# Patient Record
Sex: FEMALE | Race: WHITE | NOT HISPANIC OR LATINO | Employment: FULL TIME | URBAN - METROPOLITAN AREA
[De-identification: names, ages, dates, MRNs, and addresses within clinical notes are randomized per-mention and may not be internally consistent; named-entity substitution may affect disease eponyms.]

---

## 2017-07-21 ENCOUNTER — RADIANT APPOINTMENT (OUTPATIENT)
Dept: GENERAL RADIOLOGY | Facility: CLINIC | Age: 47
End: 2017-07-21
Attending: PHYSICIAN ASSISTANT
Payer: COMMERCIAL

## 2017-07-21 ENCOUNTER — TELEPHONE (OUTPATIENT)
Dept: FAMILY MEDICINE | Facility: OTHER | Age: 47
End: 2017-07-21

## 2017-07-21 ENCOUNTER — NURSE TRIAGE (OUTPATIENT)
Dept: NURSING | Facility: CLINIC | Age: 47
End: 2017-07-21

## 2017-07-21 ENCOUNTER — OFFICE VISIT (OUTPATIENT)
Dept: FAMILY MEDICINE | Facility: CLINIC | Age: 47
End: 2017-07-21
Payer: COMMERCIAL

## 2017-07-21 ENCOUNTER — TELEPHONE (OUTPATIENT)
Dept: FAMILY MEDICINE | Facility: CLINIC | Age: 47
End: 2017-07-21

## 2017-07-21 VITALS
BODY MASS INDEX: 25.49 KG/M2 | HEART RATE: 84 BPM | SYSTOLIC BLOOD PRESSURE: 120 MMHG | DIASTOLIC BLOOD PRESSURE: 70 MMHG | TEMPERATURE: 98.1 F | HEIGHT: 65 IN | WEIGHT: 153 LBS
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
## 2017-07-21 DIAGNOSIS — M54.50 ACUTE MIDLINE LOW BACK PAIN WITHOUT SCIATICA: ICD-10-CM

## 2017-07-21 DIAGNOSIS — M54.50 ACUTE MIDLINE LOW BACK PAIN WITHOUT SCIATICA: Primary | ICD-10-CM

## 2017-07-21 DIAGNOSIS — T14.8XXA ABRASION: ICD-10-CM

## 2017-07-21 PROCEDURE — 99214 OFFICE O/P EST MOD 30 MIN: CPT | Performed by: PHYSICIAN ASSISTANT

## 2017-07-21 PROCEDURE — 72100 X-RAY EXAM L-S SPINE 2/3 VWS: CPT

## 2017-07-21 RX ORDER — METHOCARBAMOL 750 MG/1
750 TABLET, FILM COATED ORAL 4 TIMES DAILY PRN
Qty: 30 TABLET | Refills: 0 | Status: SHIPPED | OUTPATIENT
Start: 2017-07-21 | End: 2018-03-29

## 2017-07-21 RX ORDER — HYDROCODONE BITARTRATE AND ACETAMINOPHEN 5; 325 MG/1; MG/1
1 TABLET ORAL EVERY 6 HOURS PRN
Qty: 16 TABLET | Refills: 0 | Status: SHIPPED | OUTPATIENT
Start: 2017-07-21 | End: 2018-03-29

## 2017-07-21 ASSESSMENT — PAIN SCALES - GENERAL: PAINLEVEL: EXTREME PAIN (8)

## 2017-07-21 NOTE — PROGRESS NOTES
"  SUBJECTIVE:                                                    Marychuy Gomes is a 46 year old female who presents to clinic today for the following health issues:    Back Pain       Duration:last night patient slipped on the stairs and landed on the edge of stairs. Was going down steps and slipped with feet out from under her. Landed on back and slid rest of way down. Carpeted steps. + rugburn and bled last night.    Pain takes her breath away but not pleuritic pain or SOB.    No LOC of head injury.    No nausea or vomiting.    No weakness in legs or arms        Specific cause: fall     Description:   Location of pain: low back bilateral and middle of back bilateral- patient noticed a big lump, and \"big rug burn in the back\"   Character of pain: sharp  Pain radiation:none  New numbness or weakness in legs, not attributed to pain:  no     Intensity: Currently 8/10    History:   Pain interferes with job: YES- patient have today off   History of back problems: no prior back problems  Any previous MRI or X-rays: None  Sees a specialist for back pain:  No  Therapies tried without relief:     Alleviating factors:   Improved by: acetaminophen (Tylenol), ice help with the swollen, rest       Precipitating factors:  Worsened by: Lying Flat, Walking and painful last night     Functional and Psychosocial Screen (Gu[place]ro STarT Back):      Not performed today          Accompanying Signs & Symptoms:  Risk of Fracture:  Recent history of trauma or blunt force  Risk of Cauda Equina:  None  Risk of Infection:  None  Risk of Cancer:  None  Risk of Ankylosing Spondylitis:  Onset at age <35, male, AND morning back stiffness. no           Problem list and histories reviewed & adjusted, as indicated.  Additional history: as documented    Patient Active Problem List   Diagnosis     CARDIOVASCULAR SCREENING; LDL GOAL LESS THAN 160     Sensitive to smells     Epigastric pain     Cervical high risk HPV (human papillomavirus) test " "positive     Past Surgical History:   Procedure Laterality Date     SURGICAL HISTORY OF -   1993    exploratory lap       Social History   Substance Use Topics     Smoking status: Never Smoker     Smokeless tobacco: Never Used     Alcohol use No     Family History   Problem Relation Age of Onset     CEREBROVASCULAR DISEASE Father      Pancreatic Cancer Maternal Grandfather      Prostate Cancer Paternal Uncle          Current Outpatient Prescriptions   Medication Sig Dispense Refill     Acetaminophen (TYLENOL PO) Take by mouth as needed for mild pain (for pain)       HYDROcodone-acetaminophen (NORCO) 5-325 MG per tablet Take 1 tablet by mouth every 6 hours as needed for moderate to severe pain maximum 4 tablet(s) per day 16 tablet 0     methocarbamol (ROBAXIN) 750 MG tablet Take 1 tablet (750 mg) by mouth 4 times daily as needed for muscle spasms 30 tablet 0     omeprazole (PRILOSEC) 20 MG capsule Take 1 capsule (20 mg) by mouth daily (Patient not taking: Reported on 7/21/2017) 90 capsule 1     NO ACTIVE MEDICATIONS        Allergies   Allergen Reactions     Codeine Nausea and Vomiting     Recent Labs   Lab Test  05/17/16   0825   LDL  111*   HDL  39*   TRIG  144   TSH  1.86      BP Readings from Last 3 Encounters:   07/21/17 120/70   05/17/16 110/72   05/11/16 108/60    Wt Readings from Last 3 Encounters:   07/21/17 153 lb (69.4 kg)   05/17/16 149 lb 1.6 oz (67.6 kg)   05/11/16 149 lb 11.2 oz (67.9 kg)                Reviewed and updated as needed this visit by clinical staff       Reviewed and updated as needed this visit by Provider         ROS:  Constitutional, HEENT, cardiovascular, pulmonary, gi and gu systems are negative, except as otherwise noted.      OBJECTIVE:   /70  Pulse 84  Temp 98.1  F (36.7  C) (Temporal)  Ht 5' 5.35\" (1.66 m)  Wt 153 lb (69.4 kg)  LMP 06/23/2017 (Approximate)  BMI 25.19 kg/m2  Body mass index is 25.19 kg/(m^2).   General: uncomfortable appearing 47yo female, sitting " rigidly on edge of chair  Lumbar Spine: 7cm x 4cm abrasion over lumbar spine midline. exquisitely tender with palpation of lumbar L1-L5 paraspinous and midline. Non-tender into SI, gluteal area and with pressure along posterior thigh. Not TTP into calf muscle. No edema. Pulses normal. Patellar reflexes 2+ and symmetric. Achilles reflexes 1-2+ symmetric. Sensation intact to light touch of LE.   Normal gait. Has limited forward bending, twisting, lateral bending of back.      Xr Lumbar Spine 2/3 Views    Result Date: 7/21/2017  LUMBAR SPINE TWO - THREE VIEWS   7/21/2017 3:01 PM HISTORY: Fall down stairs on back. Low back pain. Fall (on) (from) other stairs and steps, initial encounter. COMPARISON: None. FINDINGS:  There are six non-rib bearing lumbar type vertebrae. There is a transitional T12. There is mild broad-based dextroconvex curvature lumbar spine centered at L2. Otherwise, the vertebral bodies, pedicles, disc spaces, perivertebral soft tissues, alignment, and sacroiliac joints are normal in appearance.  No evidence for fracture The vertebral bodies, pedicles, disc spaces, perivertebral soft tissues, alignment, and sacroiliac joints are normal in appearance.  No evidence for fracture.     IMPRESSION:  1. Six nonrib-bearing lumbar type vertebrae. 2. Mild dextroconvex curvature of the lumbar spine centered at L2 is likely positional. 3. No other significant abnormalities of the lumbar spine are identified. No acute fracture is seen. SHANTAL BRYAN MD      ASSESSMENT/PLAN:       1. Acute midline low back pain without sciatica  No acute fracture  Ice, heat  Has not tried nsaids, and no CI to use. Trial ibuprofen 800mg q8hr w/food  If needed, did sent with hydrocodone or robaxin to try inaddition. Precautions discussed and no alcohol or driving.   Follow up if not gradually improving over next week.   - XR Lumbar Spine 2/3 Views; Future  - HYDROcodone-acetaminophen (NORCO) 5-325 MG per tablet; Take 1 tablet by mouth  every 6 hours as needed for moderate to severe pain maximum 4 tablet(s) per day  Dispense: 16 tablet; Refill: 0  - methocarbamol (ROBAXIN) 750 MG tablet; Take 1 tablet (750 mg) by mouth 4 times daily as needed for muscle spasms  Dispense: 30 tablet; Refill: 0    2. Fall down stairs, initial encounter  - XR Lumbar Spine 2/3 Views; Future    3. Abrasion  Clean, well tended  Home care discussed      Follow Up: For worsening symptoms (ie new fevers, worsening pain, etc), non-improvement as expected/discussed, questions regarding your medications or treatment plan. Discussed parameters for follow up and included in After Visit Summary given to patient.      Medina Barakat PA-C  [facility]

## 2017-07-21 NOTE — TELEPHONE ENCOUNTER
Reason for call:  Same Day Appointment  Reason for Call:  Same Day Appointment, Requested Provider:  any     PCP: Betzaida Cruz    Reason for visit: back pain from fall    Duration of symptoms: last night    Have you been treated for this in the past? No    Additional comments: is wondering if she could be worked in to [employer] today.    Can we leave a detailed message on this number? YES    Phone number patient can be reached at: Home number on file [phone] (home)    Best Time: any    Call taken on 7/21/2017 at 9:51 AM by Jewels Prieto

## 2017-07-21 NOTE — TELEPHONE ENCOUNTER
Notes Recorded by Jake Kaur on 7/21/2017 at 5:24 PM  Left message for pt.  ------    Notes Recorded by Medina Barakat PA-C on 7/21/2017 at 5:19 PM  Please contact pt- The radiologist reviewed xray- no fracture as we discussed in clinic. Take care, Medina Barakat PA-C

## 2017-07-21 NOTE — PATIENT INSTRUCTIONS
Ice or heat   Rest  No lifting, bending, twisting    Ibuprofen 800mg (4 of the OTC tabs) every 8 hours  Take with food  Stop if stomach upset    If pain not controlled with that- then hydrocodone- this is a narcotic  Or could try the muscle relaxer methocarbamol    The medication you were prescribed, is a narcotic or contains a narcotic. Narcotics can cause dizzy or drowsiness. You should not drive or operate machinery while taking this medication. Narcotics can cause constipation. If you will be taking this medication for more than a few days, add Colace (docusate sodium) 100mg twice daily to keep stools soft or Miralax 1 cap daily dissolved into a beverage of your choice.    You were prescribed a muscle relaxer. This can make you dizzy and/or drowsy.   Do NOT drink alcohol while taking.   Try for the first time at home (ie before bed) so you know how it affects you.   Do not drive while taking if you feel dizzy or drowsy.

## 2017-07-21 NOTE — MR AVS SNAPSHOT
After Visit Summary   7/21/2017    Marychuy Gomes    MRN: [id number]           Patient Information     Date Of Birth          1970        Visit Information        Provider Department      7/21/2017 2:20 PM Medina Barakat PA-C [facility]        Today's Diagnoses     Acute midline low back pain without sciatica    -  1    Fall down stairs, initial encounter        Abrasion          Care Instructions    Ice or heat   Rest  No lifting, bending, twisting    Ibuprofen 800mg (4 of the OTC tabs) every 8 hours  Take with food  Stop if stomach upset    If pain not controlled with that- then hydrocodone- this is a narcotic  Or could try the muscle relaxer methocarbamol    The medication you were prescribed, is a narcotic or contains a narcotic. Narcotics can cause dizzy or drowsiness. You should not drive or operate machinery while taking this medication. Narcotics can cause constipation. If you will be taking this medication for more than a few days, add Colace (docusate sodium) 100mg twice daily to keep stools soft or Miralax 1 cap daily dissolved into a beverage of your choice.    You were prescribed a muscle relaxer. This can make you dizzy and/or drowsy.   Do NOT drink alcohol while taking.   Try for the first time at home (ie before bed) so you know how it affects you.   Do not drive while taking if you feel dizzy or drowsy.               Follow-ups after your visit        Who to contact     If you have questions or need follow up information about today's clinic visit or your schedule please contact [facility] directly at [phone].  Normal or non-critical lab and imaging results will be communicated to you by MyChart, letter or phone within 4 business days after the clinic has received the results. If you do not hear from us within 7 days, please contact the clinic through MyChart or phone. If you have a critical or abnormal lab result, we will notify you by  "phone as soon as possible.  Submit refill requests through [employer]idya or call your pharmacy and they will forward the refill request to us. Please allow 3 business days for your refill to be completed.          Additional Information About Your Visit        cVidya Information     [employer]ya lets you send messages to your doctor, view your test results, renew your prescriptions, schedule appointments and more. To sign up, go to [email or web address] . Click on \"Log in\" on the left side of the screen, which will take you to the Welcome page. Then click on \"Sign up Now\" on the right side of the page.     You will be asked to enter the access code listed below, as well as some personal information. Please follow the directions to create your username and password.     Your access code is: [email or web address]  Expires: 10/19/2017  3:15 PM     Your access code will  in 90 days. If you need help or a new code, please call your [place] clinic or [phone].        Care EveryWhere ID     This is your Care EveryWhere ID. This could be used by other organizations to access your [place] medical records  [id number]        Your Vitals Were     Pulse Temperature Height Last Period BMI (Body Mass Index)       84 98.1  F (36.7  C) (Temporal) 5' 5.35\" (1.66 m) 2017 (Approximate) 25.19 kg/m2        Blood Pressure from Last 3 Encounters:   17 120/70   16 110/72   16 108/60    Weight from Last 3 Encounters:   17 153 lb (69.4 kg)   16 149 lb 1.6 oz (67.6 kg)   16 149 lb 11.2 oz (67.9 kg)                 Today's Medication Changes          These changes are accurate as of: 17  3:15 PM.  If you have any questions, ask your nurse or doctor.               Start taking these medicines.        Dose/Directions    HYDROcodone-acetaminophen 5-325 MG per tablet   Commonly known as:  NORCO   Used for:  Acute midline low back pain without sciatica   Started by:  Medina Barakat PA-C        Dose:  " 1 tablet   Take 1 tablet by mouth every 6 hours as needed for moderate to severe pain maximum 4 tablet(s) per day   Quantity:  16 tablet   Refills:  0       methocarbamol 750 MG tablet   Commonly known as:  ROBAXIN   Used for:  Acute midline low back pain without sciatica   Started by:  Medina Barakat PA-C        Dose:  750 mg   Take 1 tablet (750 mg) by mouth 4 times daily as needed for muscle spasms   Quantity:  30 tablet   Refills:  0            Where to get your medicines      Some of these will need a paper prescription and others can be bought over the counter.  Ask your nurse if you have questions.     Bring a paper prescription for each of these medications     HYDROcodone-acetaminophen 5-325 MG per tablet    methocarbamol 750 MG tablet                Primary Care Provider Office Phone # Fax #    Betzaida Cruz PA-C [phone]       [facility] [address] [place] [address]        Equal Access to Services     [facility]: Hadii ana castillo hadasho Sorita, waaxda luqadaha, qaybta kaalmada adeegyada, cj lisa hayedward darden . So [facility] [phone].    ATENCIÓN: Si habla español, tiene a lockwood disposición servicios gratuitos de asistencia lingüística. Abhijeet al [phone].    We comply with applicable federal civil rights laws and [place] laws. We do not discriminate on the basis of race, color, national origin, age, disability sex, sexual orientation or gender identity.            Thank you!     Thank you for choosing [facility]  for your care. Our goal is always to provide you with excellent care. Hearing back from our patients is one way we can continue to improve our services. Please take a few minutes to complete the written survey that you may receive in the mail after your visit with us. Thank you!             Your Updated Medication List - Protect others around you: Learn how to safely use, store and throw away your medicines at  www.disposemymeds.org.          This list is accurate as of: 7/21/17  3:15 PM.  Always use your most recent med list.                   Brand Name Dispense Instructions for use Diagnosis    HYDROcodone-acetaminophen 5-325 MG per tablet    NORCO    16 tablet    Take 1 tablet by mouth every 6 hours as needed for moderate to severe pain maximum 4 tablet(s) per day    Acute midline low back pain without sciatica       methocarbamol 750 MG tablet    ROBAXIN    30 tablet    Take 1 tablet (750 mg) by mouth 4 times daily as needed for muscle spasms    Acute midline low back pain without sciatica       NO ACTIVE MEDICATIONS           omeprazole 20 MG CR capsule    priLOSEC    90 capsule    Take 1 capsule (20 mg) by mouth daily    Epigastric pain       TYLENOL PO      Take by mouth as needed for mild pain (for pain)

## 2017-07-21 NOTE — NURSING NOTE
"Chief Complaint   Patient presents with     Back Pain     Panel Management     MyChart, Pap       Initial /70  Pulse 84  Temp 98.1  F (36.7  C) (Temporal)  Ht 5' 5.35\" (1.66 m)  Wt 153 lb (69.4 kg)  LMP 06/23/2017 (Approximate)  BMI 25.19 kg/m2 Estimated body mass index is 25.19 kg/(m^2) as calculated from the following:    Height as of this encounter: 5' 5.35\" (1.66 m).    Weight as of this encounter: 153 lb (69.4 kg).  Medication Reconciliation: complete     Jake Kaur MA    "

## 2017-07-21 NOTE — TELEPHONE ENCOUNTER
RN's do you feel this should be triaged?  If not, please forward to team to see if anyone can work in.

## 2017-07-21 NOTE — TELEPHONE ENCOUNTER
"Marychuy Gomes is a 46 year old female who calls with back injury.    NURSING ASSESSMENT:  Description: Patient has pain about 4 inches above her tailbone. She is able to walk and sit, but it hurts the most when her back is touching something.   Onset/duration: today  Precip. factors: Slipped on the stairs in the middle of the night and her back landed on the edge of a stair and then she slid down three steps.   Associated symptoms:  \"rug burn\" on her back  Improves/worsens symptoms:  Icing last night 15 mins on 15 mins off. Alternating heat and ice today, heat helps most  Pain scale (0-10)   6/10  Allergies:   Allergies   Allergen Reactions     Codeine Nausea and Vomiting       RECOMMENDED DISPOSITION:  See in 4 hours - due to worsening pain  Will comply with recommendation: YES     Next 5 appointments (look out 90 days)     Jul 21, 2017  2:20 PM CDT   Office Visit with Medina Barakat PA-C   [facility] ([facility])    [address] [place] [address], [address]  [facility] [address] [phone]                  If further questions/concerns or if Sx do not improve, worsen or new Sx develop, call your PCP or [facility] as soon as possible.    NOTES:  Disposition was determined by the first positive assessment question, therefore all previous assessment questions were negative.     Guideline used:  Telephone Triage Protocols for Nurses, Fifth Edition, Eileen Hodge  Back injury    Medina Cross, RN, BSN      "

## 2017-07-22 ENCOUNTER — HEALTH MAINTENANCE LETTER (OUTPATIENT)
Age: 47
End: 2017-07-22

## 2017-07-22 NOTE — TELEPHONE ENCOUNTER
Additional Information    Negative: [1] Caller is not with the adult (patient) AND [2] reporting urgent symptoms    Negative: Lab result questions    Negative: Medication questions    Negative: Caller cannot be reached by phone    Negative: Caller has already spoken to PCP or another triager    Negative: RN needs further essential information from caller in order to complete triage    Negative: Requesting regular office appointment    Negative: [1] Caller requesting NON-URGENT health information AND [2] PCP's office is the best resource    Negative: Health Information question, no triage required and triager able to answer question    Negative: General information question, no triage required and triager able to answer question    Negative: Question about upcoming scheduled test, no triage required and triager able to answer question    Negative: [1] Caller is not with the adult (patient) AND [2] probable NON-URGENT symptoms    [1] Follow-up call to recent contact AND [2] information only call, no triage required    Protocols used: INFORMATION ONLY CALL-ADULT-STEFANI Perrin returning call from clinic regarding xray results. This nurse provided said information to patient via note in EPIC. Encouraged to call [facility] back if worsening of symptoms, further questions or concerns.     Deana Adkins RN  [facility]

## 2017-07-22 NOTE — TELEPHONE ENCOUNTER
Marychuy returning call from clinic, reached FNA nurse instead. This nurse relayed to the patient, the xray results note dated today 7/21/17, as seen below, from Medina Barakat PA-C. She had no further questions at this time. Encouraged her to call FNA back with new or worsening symptoms, questions or concerns.     Deana Adkins RN  [facility]

## 2018-03-26 ENCOUNTER — OFFICE VISIT (OUTPATIENT)
Dept: FAMILY MEDICINE | Facility: OTHER | Age: 48
End: 2018-03-26
Payer: COMMERCIAL

## 2018-03-26 VITALS
HEART RATE: 84 BPM | SYSTOLIC BLOOD PRESSURE: 110 MMHG | WEIGHT: 156.8 LBS | HEIGHT: 65 IN | RESPIRATION RATE: 14 BRPM | TEMPERATURE: 98.6 F | DIASTOLIC BLOOD PRESSURE: 72 MMHG | BODY MASS INDEX: 26.12 KG/M2

## 2018-03-26 DIAGNOSIS — R87.810 CERVICAL HIGH RISK HPV (HUMAN PAPILLOMAVIRUS) TEST POSITIVE: Primary | ICD-10-CM

## 2018-03-26 DIAGNOSIS — E55.9 VITAMIN D DEFICIENCY: ICD-10-CM

## 2018-03-26 DIAGNOSIS — D22.9 ATYPICAL MOLE: ICD-10-CM

## 2018-03-26 DIAGNOSIS — Z13.6 CARDIOVASCULAR SCREENING; LDL GOAL LESS THAN 160: ICD-10-CM

## 2018-03-26 DIAGNOSIS — Z00.00 ENCOUNTER FOR ROUTINE ADULT HEALTH EXAMINATION WITHOUT ABNORMAL FINDINGS: ICD-10-CM

## 2018-03-26 DIAGNOSIS — Z12.31 ENCOUNTER FOR SCREENING MAMMOGRAM FOR BREAST CANCER: ICD-10-CM

## 2018-03-26 DIAGNOSIS — Z13.1 SCREENING FOR DIABETES MELLITUS: ICD-10-CM

## 2018-03-26 LAB
CHOLEST SERPL-MCNC: 215 MG/DL
DEPRECATED CALCIDIOL+CALCIFEROL SERPL-MC: 50 UG/L (ref 20–75)
GLUCOSE SERPL-MCNC: 85 MG/DL (ref 70–99)
HDLC SERPL-MCNC: 40 MG/DL
LDLC SERPL CALC-MCNC: 133 MG/DL
NONHDLC SERPL-MCNC: 175 MG/DL
TRIGL SERPL-MCNC: 211 MG/DL

## 2018-03-26 PROCEDURE — 82947 ASSAY GLUCOSE BLOOD QUANT: CPT | Performed by: PHYSICIAN ASSISTANT

## 2018-03-26 PROCEDURE — 87624 HPV HI-RISK TYP POOLED RSLT: CPT | Performed by: PHYSICIAN ASSISTANT

## 2018-03-26 PROCEDURE — 88175 CYTOPATH C/V AUTO FLUID REDO: CPT | Performed by: PHYSICIAN ASSISTANT

## 2018-03-26 PROCEDURE — 80061 LIPID PANEL: CPT | Performed by: PHYSICIAN ASSISTANT

## 2018-03-26 PROCEDURE — 36415 COLL VENOUS BLD VENIPUNCTURE: CPT | Performed by: PHYSICIAN ASSISTANT

## 2018-03-26 PROCEDURE — 99396 PREV VISIT EST AGE 40-64: CPT | Performed by: PHYSICIAN ASSISTANT

## 2018-03-26 PROCEDURE — 82306 VITAMIN D 25 HYDROXY: CPT | Performed by: PHYSICIAN ASSISTANT

## 2018-03-26 ASSESSMENT — PAIN SCALES - GENERAL: PAINLEVEL: MILD PAIN (2)

## 2018-03-26 NOTE — LETTER
"March 28, 2018      Marychuy GENO Gomes  [id number] [address] [place] [id number]        Dear ,    We are writing to inform you of your test results.    Your vitamin D level is normal.  Your blood sugar is normal as well.  Your cholesterol has increased overall in the past year.  Your triglycerides and LDL are both elevated, these are both considered to be \"bad\" components to your cholesterol.  Your HDL or good cholesterol is low.  Eating a diet low in saturated fats, cholesterol, sugar, and alcohol  as well as exercising on a regular basis will help to improve these results.   Let's recheck this again in about 6-12 months to make sure it is getting better.     Resulted Orders   Lipid panel reflex to direct LDL Fasting   Result Value Ref Range    Cholesterol 215 (H) <200 mg/dL      Comment:      Desirable:       <200 mg/dl    Triglycerides 211 (H) <150 mg/dL      Comment:      Borderline high:  150-199 mg/dl  High:             200-499 mg/dl  Very high:       >499 mg/dl      HDL Cholesterol 40 (L) >49 mg/dL    LDL Cholesterol Calculated 133 (H) <100 mg/dL      Comment:      Above desirable:  100-129 mg/dl  Borderline High:  130-159 mg/dL  High:             160-189 mg/dL  Very high:       >189 mg/dl      Non HDL Cholesterol 175 (H) <130 mg/dL      Comment:      Above Desirable:  130-159 mg/dl  Borderline high:  160-189 mg/dl  High:             190-219 mg/dl  Very high:       >219 mg/dl     Glucose   Result Value Ref Range    Glucose 85 70 - 99 mg/dL   Vitamin D Deficiency   Result Value Ref Range    Vitamin D Deficiency screening 50 20 - 75 ug/L      Comment:      Season, race, dietary intake, and treatment affect the concentration of   25-hydroxy-Vitamin D. Values may decrease during winter months and increase   during summer months. Values 20-29 ug/L may indicate Vitamin D insufficiency   and values <20 ug/L may indicate Vitamin D deficiency.  Vitamin D determination is routinely performed by an " immunoassay specific for   25 hydroxyvitamin D3.  If an individual is on vitamin D2 (ergocalciferol)   supplementation, please specify 25 OH vitamin D2 and D3 level determination by   LCMSMS test [id number].         If you have any questions or concerns, please call the clinic at the number listed above.       Sincerely,        Betzaida Cruz PA-C

## 2018-03-26 NOTE — NURSING NOTE
"Chief Complaint   Patient presents with     Physical       Initial /72  Pulse 84  Temp 98.6  F (37  C) (Temporal)  Resp 14  Ht 5' 5.35\" (1.66 m)  Wt 156 lb 12.8 oz (71.1 kg)  BMI 25.81 kg/m2 Estimated body mass index is 25.81 kg/(m^2) as calculated from the following:    Height as of this encounter: 5' 5.35\" (1.66 m).    Weight as of this encounter: 156 lb 12.8 oz (71.1 kg).  Medication Reconciliation: complete    "

## 2018-03-26 NOTE — MR AVS SNAPSHOT
After Visit Summary   3/26/2018    Marychuy Gomes    MRN: [id number]           Patient Information     Date Of Birth          1970        Visit Information        Provider Department      3/26/2018 8:45 AM Betzaida Cruz PA-C [facility]        Today's Diagnoses     Cervical high risk HPV (human papillomavirus) test positive    -  1    Encounter for routine adult health examination without abnormal findings        Encounter for screening mammogram for breast cancer        Vitamin D deficiency        Screening for diabetes mellitus        CARDIOVASCULAR SCREENING; LDL GOAL LESS THAN 160          Care Instructions      Preventive Health Recommendations  Female Ages 40 to 49    Yearly exam:     See your health care provider every year in order to  1. Review health changes.   2. Discuss preventive care.    3. Review your medicines if your doctor prescribed any.      Get a Pap test every three years (unless you have an abnormal result and your provider advises testing more often).      If you get Pap tests with HPV test, you only need to test every 5 years, unless you have an abnormal result. You do not need a Pap test if your uterus was removed (hysterectomy) and you have not had cancer.      You should be tested each year for STDs (sexually transmitted diseases), if you're at risk.       Ask your doctor if you should have a mammogram.      Have a colonoscopy (test for colon cancer) if someone in your family has had colon cancer or polyps before age 50.       Have a cholesterol test every 5 years.       Have a diabetes test (fasting glucose) after age 45. If you are at risk for diabetes, you should have this test every 3 years.    Shots: Get a flu shot each year. Get a tetanus shot every 10 years.     Nutrition:     Eat at least 5 servings of fruits and vegetables each day.    Eat whole-grain bread, whole-wheat pasta and brown rice instead of white grains and rice.    Talk to  "your provider about Calcium and Vitamin D.     Lifestyle    Exercise at least 150 minutes a week (an average of 30 minutes a day, 5 days a week). This will help you control your weight and prevent disease.    Limit alcohol to one drink per day.    No smoking.     Wear sunscreen to prevent skin cancer.    See your dentist every six months for an exam and cleaning.          Follow-ups after your visit        Your next 10 appointments already scheduled     Mar 27, 2018  9:45 AM CDT   (Arrive by 9:30 AM)   MA SCREENING DIGITAL BILATERAL with MGMA1, MG MA TECH   [facility]    [address] [place] [id number] [phone]           Do not use any powder, lotion or deodorant under your arms or on your breast. If you do, we will ask you to remove it before your exam.  Wear comfortable, two-piece clothing.  If you have any allergies, tell your care team.  Bring any previous mammograms from other facilities or have them mailed to the breast center. Three-dimensional (3D) mammograms are available at [place] locations in [facility], [facility], [facility], and [place]. [facility] locations include [place] and [facility] in [place]. Benefits of 3D mammograms include: - Improved rate of cancer detection - Decreases your chance of having to go back for more tests, which means fewer: - \"False-positive\" results (This means that there is an abnormal area but it isn't cancer.) - Invasive testing procedures, such as a biopsy or surgery - Can provide clearer images of the breast if you have dense breast tissue. 3D mammography is an optional exam that anyone can have with a 2D mammogram. It doesn't replace or take the place of a 2D mammogram. 2D mammograms remain an effective screening test for all women.  Not all insurance companies cover the cost of a 3D mammogram. Check with your insurance.              Future " "tests that were ordered for you today     Open Future Orders        Priority Expected Expires Ordered    *MA Screening Digital Bilateral Routine  3/26/2019 3/26/2018            Who to contact     If you have questions or need follow up information about today's clinic visit or your schedule please contact [facility] directly at [phone].  Normal or non-critical lab and imaging results will be communicated to you by MyChart, letter or phone within 4 business days after the clinic has received the results. If you do not hear from us within 7 days, please contact the clinic through Myerhart or phone. If you have a critical or abnormal lab result, we will notify you by phone as soon as possible.  Submit refill requests through MobiDough or call your pharmacy and they will forward the refill request to us. Please allow 3 business days for your refill to be completed.          Additional Information About Your Visit        [employer] Information     MobiDough lets you send messages to your doctor, view your test results, renew your prescriptions, schedule appointments and more. To sign up, go to [email or web address] . Click on \"Log in\" on the left side of the screen, which will take you to the Welcome page. Then click on \"Sign up Now\" on the right side of the page.     You will be asked to enter the access code listed below, as well as some personal information. Please follow the directions to create your username and password.     Your access code is: [email or web address]  Expires: 2018  8:37 AM     Your access code will  in 90 days. If you need help or a new code, please call your [place] clinic or [phone].        Care EveryWhere ID     This is your Care EveryWhere ID. This could be used by other organizations to access your [facility] medical records  [id number]        Your Vitals Were     Pulse Temperature Respirations Height Last Period BMI (Body Mass Index)    84 98.6  F (37  C) (Temporal) 14 " "5' 5.35\" (1.66 m) 03/21/2018 25.81 kg/m2       Blood Pressure from Last 3 Encounters:   03/26/18 110/72   07/21/17 120/70   05/17/16 110/72    Weight from Last 3 Encounters:   03/26/18 156 lb 12.8 oz (71.1 kg)   07/21/17 153 lb (69.4 kg)   05/17/16 149 lb 1.6 oz (67.6 kg)              We Performed the Following     Glucose     HPV High Risk Types DNA Cervical     Lipid panel reflex to direct LDL Fasting     Pap imaged thin layer diagnostic with HPV (select HPV order below)     Vitamin D Deficiency        Primary Care Provider Office Phone # Fax #    Betzaida Cruz PA-C [phone] [address] [place] [address]        Equal Access to Services     [facility]: Hadii ana calle Sorita, waaxda luqadaha, qaybta kaalmadouglas sanders, cj darden . So [facility] [phone].    ATENCIÓN: Si habla español, tiene a lockwood disposición servicios gratuitos de asistencia lingüística. [facility] [phone].    We comply with applicable federal civil rights laws and [place] laws. We do not discriminate on the basis of race, color, national origin, age, disability, sex, sexual orientation, or gender identity.            Thank you!     Thank you for choosing [facility]  for your care. Our goal is always to provide you with excellent care. Hearing back from our patients is one way we can continue to improve our services. Please take a few minutes to complete the written survey that you may receive in the mail after your visit with us. Thank you!             Your Updated Medication List - Protect others around you: Learn how to safely use, store and throw away your medicines at www.disposemymeds.org.          This list is accurate as of 3/26/18  9:31 AM.  Always use your most recent med list.                   Brand Name Dispense Instructions for use Diagnosis    HYDROcodone-acetaminophen 5-325 MG per tablet    NORCO    16 tablet    Take 1 tablet by mouth every 6 " hours as needed for moderate to severe pain maximum 4 tablet(s) per day    Acute midline low back pain without sciatica       methocarbamol 750 MG tablet    ROBAXIN    30 tablet    Take 1 tablet (750 mg) by mouth 4 times daily as needed for muscle spasms    Acute midline low back pain without sciatica       NO ACTIVE MEDICATIONS           omeprazole 20 MG CR capsule    priLOSEC    90 capsule    Take 1 capsule (20 mg) by mouth daily    Epigastric pain       TYLENOL PO      Take by mouth as needed for mild pain (for pain)

## 2018-03-26 NOTE — PROGRESS NOTES
SUBJECTIVE:   CC: Marychuy Gomes is an 47 year old woman who presents for preventive health visit.     Physical   Annual:     Getting at least 3 servings of Calcium per day::  Yes    Bi-annual eye exam::  Yes    Dental care twice a year::  NO    Sleep apnea or symptoms of sleep apnea::  None    Taking medications regularly::  Not Applicable    Medication side effects::  Other    Additional concerns today::  YES          Low back pain that she was seen July 2017 for has not revolved, with it she has foot pain in the right side. Chiropractor said it was sciatic.       Today's PHQ-2 Score:   PHQ-2 ( 1999 Pfizer) 3/26/2018   Q1: Little interest or pleasure in doing things 1   Q2: Feeling down, depressed or hopeless 1   PHQ-2 Score 2   Q1: Little interest or pleasure in doing things Several days   Q2: Feeling down, depressed or hopeless Several days   PHQ-2 Score 2       Abuse: Current or Past(Physical, Sexual or Emotional)- No  Do you feel safe in your environment - Yes    Social History   Substance Use Topics     Smoking status: Never Smoker     Smokeless tobacco: Never Used     Alcohol use No     Alcohol Use 3/26/2018   If you drink alcohol do you typically have greater than 3 drinks per day OR greater than 7 drinks per week? Not Applicable       Reviewed orders with patient.  Reviewed health maintenance and updated orders accordingly - Yes  Labs reviewed in EPIC  BP Readings from Last 3 Encounters:   03/26/18 110/72   07/21/17 120/70   05/17/16 110/72    Wt Readings from Last 3 Encounters:   03/26/18 156 lb 12.8 oz (71.1 kg)   07/21/17 153 lb (69.4 kg)   05/17/16 149 lb 1.6 oz (67.6 kg)                    Patient under age 50, mutual decision reflected in health maintenance.      Pertinent mammograms are reviewed under the imaging tab.  History of abnormal Pap smear:   YES - updated in Problem List and Health Maintenance accordingly  Last 3 Pap and HPV Results:   PAP / HPV Latest Ref Rng & Units 5/17/2016  "  PAP - NIL   HPV 16 DNA NEG Negative   HPV 18 DNA NEG Negative   OTHER HR HPV NEG Positive(A)       Reviewed and updated as needed this visit by clinical staff  Tobacco  Allergies  Meds  Problems  Med Hx  Surg Hx  Fam Hx  Soc Hx          Reviewed and updated as needed this visit by Provider  Tobacco  Allergies  Meds  Problems            Review of Systems  CONSTITUTIONAL:a bit of weight gain due to her back pain preventing her from exercise  INTEGUMENTARY/SKIN: she has many moles on her body, one itches on her back a friend scratched it and part of it came off   EYES: flashing along the lateral field of vision of her right eye  ENT: NEGATIVE for ear, mouth and throat problems  R: NEGATIVE for significant cough or SOB  B: NEGATIVE for masses, tenderness or discharge  CV: NEGATIVE for chest pain, palpitations or peripheral edema  GI: has started to have reflux again from drinking soda, her reflux makes her cough she is tapering this off  : NEGATIVE for unusual urinary or vaginal symptoms. Periods are regular.  MUSCULOSKELETAL: back pain  N: NEGATIVE for weakness, dizziness or paresthesias  PSYCHIATRIC: See PHQ 9 and NENA 7 questionnaires for symptoms.  She feels that her vit D level is deficient, history of deficiency and needing prescription strength supplement      OBJECTIVE:   /72  Pulse 84  Temp 98.6  F (37  C) (Temporal)  Resp 14  Ht 5' 5.35\" (1.66 m)  Wt 156 lb 12.8 oz (71.1 kg)  LMP 03/21/2018  BMI 25.81 kg/m2  Physical Exam  GENERAL: healthy, alert and no distress  EYES: Eyes grossly normal to inspection, PERRL and conjunctivae and sclerae normal  HENT: ear canals and TM's normal, nose and mouth without ulcers or lesions  NECK: no adenopathy, no asymmetry, masses, or scars and thyroid normal to palpation  RESP: lungs clear to auscultation - no rales, rhonchi or wheezes  BREAST: normal without masses, tenderness or nipple discharge and no palpable axillary masses or adenopathy  CV: " "regular rate and rhythm, normal S1 S2, no S3 or S4, no murmur, click or rub, no peripheral edema and peripheral pulses strong  ABDOMEN: soft, nontender, no hepatosplenomegaly, no masses and bowel sounds normal   (female): normal female external genitalia, normal urethral meatus, vaginal mucosa pink, moist, well rugated, and normal cervix  MS: no gross musculoskeletal defects noted, no edema  SKIN:  mid back just to the left of midline, hyperpigmented macule with darker spot within approx 7-8 mm in diameter   NEURO: Normal strength and tone, mentation intact and speech normal  PSYCH: mentation appears normal, affect normal/bright    ASSESSMENT/PLAN:   1. Encounter for routine adult health examination without abnormal findings  Healthy female, she will schedule to see sports med re her back pain    2. Cervical high risk HPV (human papillomavirus) test positive    - HPV High Risk Types DNA Cervical  - Pap imaged thin layer diagnostic with HPV (select HPV order below)    3. Encounter for screening mammogram for breast cancer    - *MA Screening Digital Bilateral; Future    4. Vitamin D deficiency  Will treat with rx strength if needed   - Vitamin D Deficiency    5. Screening for diabetes mellitus    - Glucose    6. CARDIOVASCULAR SCREENING; LDL GOAL LESS THAN 160    - Lipid panel reflex to direct LDL Fasting  7. Atypical mole- rtc for punch biopsy    COUNSELING:  Reviewed preventive health counseling, as reflected in patient instructions         reports that she has never smoked. She has never used smokeless tobacco.    Estimated body mass index is 25.81 kg/(m^2) as calculated from the following:    Height as of this encounter: 5' 5.35\" (1.66 m).    Weight as of this encounter: 156 lb 12.8 oz (71.1 kg).   Weight management plan: Discussed healthy diet and exercise guidelines and patient will follow up in 12 months in clinic to re-evaluate.    Counseling Resources:  ATP IV Guidelines  Pooled Cohorts Equation " Calculator  Breast Cancer Risk Calculator  FRAX Risk Assessment  ICSI Preventive Guidelines  Dietary Guidelines for Americans, 2010  USDA's MyPlate  ASA Prophylaxis  Lung CA Screening    Betzaida Cruz PA-C  [facility]  Answers for HPI/ROS submitted by the patient on 3/26/2018   PHQ-2 Score: 2  Electronically signed by Betzaida Cruz PA-C

## 2018-03-27 ENCOUNTER — RADIANT APPOINTMENT (OUTPATIENT)
Dept: MAMMOGRAPHY | Facility: CLINIC | Age: 48
End: 2018-03-27
Attending: PHYSICIAN ASSISTANT
Payer: COMMERCIAL

## 2018-03-27 DIAGNOSIS — Z12.31 ENCOUNTER FOR SCREENING MAMMOGRAM FOR BREAST CANCER: ICD-10-CM

## 2018-03-27 PROCEDURE — 77067 SCR MAMMO BI INCL CAD: CPT

## 2018-03-27 PROCEDURE — 77063 BREAST TOMOSYNTHESIS BI: CPT

## 2018-03-28 LAB
COPATH REPORT: NORMAL
PAP: NORMAL

## 2018-03-29 ENCOUNTER — OFFICE VISIT (OUTPATIENT)
Dept: ORTHOPEDICS | Facility: OTHER | Age: 48
End: 2018-03-29
Payer: COMMERCIAL

## 2018-03-29 ENCOUNTER — RADIANT APPOINTMENT (OUTPATIENT)
Dept: GENERAL RADIOLOGY | Facility: OTHER | Age: 48
End: 2018-03-29
Attending: PHYSICAL MEDICINE & REHABILITATION
Payer: COMMERCIAL

## 2018-03-29 VITALS
BODY MASS INDEX: 26.12 KG/M2 | HEIGHT: 65 IN | WEIGHT: 156.8 LBS | SYSTOLIC BLOOD PRESSURE: 112 MMHG | DIASTOLIC BLOOD PRESSURE: 74 MMHG

## 2018-03-29 DIAGNOSIS — M54.50 CHRONIC LEFT-SIDED LOW BACK PAIN WITHOUT SCIATICA: Primary | ICD-10-CM

## 2018-03-29 DIAGNOSIS — G89.29 CHRONIC LEFT-SIDED LOW BACK PAIN WITHOUT SCIATICA: Primary | ICD-10-CM

## 2018-03-29 DIAGNOSIS — M79.671 RIGHT FOOT PAIN: ICD-10-CM

## 2018-03-29 PROCEDURE — 73630 X-RAY EXAM OF FOOT: CPT | Mod: RT

## 2018-03-29 PROCEDURE — 99203 OFFICE O/P NEW LOW 30 MIN: CPT | Performed by: PHYSICAL MEDICINE & REHABILITATION

## 2018-03-29 NOTE — MR AVS SNAPSHOT
After Visit Summary   3/29/2018    Marychuy Gomes    MRN: [id number]           Patient Information     Date Of Birth          1970        Visit Information        Provider Department      3/29/2018 8:20 AM Xenia Bartlett MD [facility]        Today's Diagnoses     Low back pain    -  1    Right foot pain          Care Instructions    Today's Plan of Care:  Rehab: Physical Therapy: [facility] - [phone]. Please do 5-6 days of exercises per week between formal sessions and the home exercises they provide.  -Ice or heat 15-20 minutes as needed (Avoid sleeping on a heating pad or ice)  -Patient's preferred over the counter medications as directed on packaging as needed for pain or soreness.  Please take ibuprofen with food. Do not premedicate prior to activity.  -Referal to orthotics  -Will call with xray results of right foot.       -We also discussed other future treatment options:  Lumbar spine MRI    Follow Up: 6-8 weeks or sooner if symptoms fail to improve or worsen. Please call with any questions or concerns.               Follow-ups after your visit        Additional Services     ORTHOTICS REFERRAL       **This referral order prints off in the [facility]  (Orthotics & Prosthetics) Central Scheduling Office**    The [facility] Central Scheduling Staff will contact the patient to schedule appointments.     Central Scheduling Contact Information: [phone] ([place])    Orthotics: Foot Orthotics    Please be aware that coverage of these services is subject to the terms and limitations of your health insurance plan.  Call member services at your health plan with any benefit or coverage questions.      Please bring the following to your appointment:    >>   Any x-rays, CTs or MRIs which have been performed.  Contact the facility where they were done to arrange for  prior to your scheduled appointment.    >>   List  "of current medications   >>   This referral request   >>   Any documents/labs given to you for this referral            PHYSICAL THERAPY REFERRAL       *This therapy referral will be filtered to a centralized scheduling office at [facility] and the patient will receive a call to schedule an appointment at a [place] location most convenient for them. *     [facility] provides Physical Therapy evaluation and treatment and many specialty services across the [place] system.  If requesting a specialty program, please choose from the list below.    If you have not heard from the scheduling office within 2 business days, please call [phone] for all locations, with the exception of [place], please call [phone] and [facility], please call [phone]  Treatment: Evaluation & Treatment  Special Instructions/Modalities: none  Special Programs: None    Please be aware that coverage of these services is subject to the terms and limitations of your health insurance plan.  Call member services at your health plan with any benefit or coverage questions.      **Note to Provider:  If you are referring outside of [facility] for the therapy appointment, please list the name of the location in the \"special instructions\" above, print the referral and give to the patient to schedule the appointment.                  Future tests that were ordered for you today     Open Future Orders        Priority Expected Expires Ordered    XR Foot Right G/E 3 Views Routine 3/29/2018 3/29/2019 3/29/2018            Who to contact     If you have questions or need follow up information about today's clinic visit or your schedule please contact [facility] directly at [phone].  Normal or non-critical lab and imaging results will be communicated to you by MyChart, letter or phone within 4 business days after the clinic has received the results. If you do not hear from us within 7 " "days, please contact the clinic through Locately or phone. If you have a critical or abnormal lab result, we will notify you by phone as soon as possible.  Submit refill requests through Locately or call your pharmacy and they will forward the refill request to us. Please allow 3 business days for your refill to be completed.          Additional Information About Your Visit        CertusharAlere Information     Locately lets you send messages to your doctor, view your test results, renew your prescriptions, schedule appointments and more. To sign up, go to [email or web address] . Click on \"Log in\" on the left side of the screen, which will take you to the Welcome page. Then click on \"Sign up Now\" on the right side of the page.     You will be asked to enter the access code listed below, as well as some personal information. Please follow the directions to create your username and password.     Your access code is: [email or web address]  Expires: 2018  8:37 AM     Your access code will  in 90 days. If you need help or a new code, please call your [place] clinic or [phone].        Care EveryWhere ID     This is your Care EveryWhere ID. This could be used by other organizations to access your [facility] medical records  [id number]        Your Vitals Were     Height Last Period BMI (Body Mass Index)             5' 5.35\" (1.66 m) 2018 25.81 kg/m2          Blood Pressure from Last 3 Encounters:   18 112/74   18 110/72   17 120/70    Weight from Last 3 Encounters:   18 156 lb 12.8 oz (71.1 kg)   18 156 lb 12.8 oz (71.1 kg)   17 153 lb (69.4 kg)              We Performed the Following     ORTHOTICS REFERRAL     PHYSICAL THERAPY REFERRAL        Primary Care Provider Office Phone # Fax #    Betzaida Cruz PA-C [phone] [address] [place] [address]        Equal Access to Services     ANNE-MARIE BOWDEN AH: michael Knutson, eulalio " cj segalvania darden ah. So [facility] [phone].    ATENCIÓN: Si josela justina, tiene a lockwood disposición servicios gratuitos de asistencia lingüística. Abhijeet al [phone].    We comply with applicable federal civil rights laws and [place] laws. We do not discriminate on the basis of race, color, national origin, age, disability, sex, sexual orientation, or gender identity.            Thank you!     Thank you for choosing [facility]  for your care. Our goal is always to provide you with excellent care. Hearing back from our patients is one way we can continue to improve our services. Please take a few minutes to complete the written survey that you may receive in the mail after your visit with us. Thank you!             Your Updated Medication List - Protect others around you: Learn how to safely use, store and throw away your medicines at www.disposemymeds.org.          This list is accurate as of 3/29/18  9:17 AM.  Always use your most recent med list.                   Brand Name Dispense Instructions for use Diagnosis    NO ACTIVE MEDICATIONS           omeprazole 20 MG CR capsule    priLOSEC    90 capsule    Take 1 capsule (20 mg) by mouth daily    Epigastric pain       TYLENOL PO      Take by mouth as needed for mild pain (for pain)

## 2018-03-29 NOTE — LETTER
3/29/2018         RE: Marychuy Gomes  [address] [address] [place] [id number]        Dear Colleague,    Thank you for referring your patient, Marychuy Gomes, to the [facility]. Please see a copy of my visit note below.    Sports Medicine Clinic Visit    PCP: Betzaida Cruz    CC: Patient presents with:  Back Pain      HPI:  Marychuy Gomes is a 47 year old female who is seen in consultation at the request of Betzaida Cruz.   She notes left midline low back pain pain that began last July when she fell down a flight of stairs. She also notes right lateral midfoot pain in combination.  She rates the pain at a 5/10 at its worst and a 2/10 currently.  Symptoms are relieved with ibuprofen, Robaxin (not taking currently), and yoga. Symptoms are worsened by running, with pre menstrual symptoms. She endorses back pain.   She denies swelling, bruising, popping, grinding, catching, locking, instability, numbness, tingling, weakness, pain in other joints and fever, chills.  Other treatment has included chiropractic care (4 months- Sept-Dec, some relief), hot baths. She notes difficulty with exercising.       Review of Systems:  Musculoskeletal: as above  Remainder of review of systems is negative including constitutional, eyes, ENT, CV, pulmonary, GI, , endocrine, skin, hematologic, and neurologic except as noted in HPI or medical history.    History reviewed. No pertinent past surgical/medical/family/social history other than as mentioned in HPI.    Past Medical History:   Diagnosis Date     Cervical high risk HPV (human papillomavirus) test positive 5/17/16 5/17/16 NIL/+ HR HPV other.      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     Social History Main Topics     Smoking status: Never Smoker     Smokeless tobacco: Never Used     Alcohol use No     Drug use: No     Sexual activity: Yes     Partners: Male     Birth control/ protection: None     Other Topics Concern     Caffeine Concern No     Sleep Concern No     Stress Concern No     Weight Concern No     Exercise Yes     Seat Belt Yes     Social History Narrative       She works at [employer]    Current Outpatient Prescriptions   Medication     Acetaminophen (TYLENOL PO)     omeprazole (PRILOSEC) 20 MG capsule     NO ACTIVE MEDICATIONS     No current facility-administered medications for this visit.      Allergies   Allergen Reactions     Codeine Nausea and Vomiting         Objective:  /74  Ht 5' 5.35\" (1.66 m)  Wt 156 lb 12.8 oz (71.1 kg)  LMP 03/21/2018  BMI 25.81 kg/m2    General: Alert and in no distress    Head: Normocephalic, atraumatic  Eyes: no scleral icterus or conjunctival erythema   Oropharynx:  Mucous membranes moist  Skin: no erythema, petechiae, or jaundice  CV: regular rhythm by palpation, 2+ distal pulses  Resp: normal respiratory effort without conversational dyspnea   Psych: normal mood and affect    Gait: Feet slightly abducted with ambulation  Neuro: Motor strength and sensation as noted below    Musculoskeletal:    Low back exam:    Inspection:     no visible deformity in the low back       normal skin       normal vascular       normal lymphatic       no asymmetry    Foot Inspection: Right dorsolateral foot swelling    Palpation:  Tender over the left mid lumbar region just lateral to midline    ROM: Full lumbar flexion, extension, rotation, and lateral flexion.  Right lateral flexion is painful.    Strength:  5/5 hip flexion/abduction/adduction, knee " flexion/extension, ankle dorsiflexion/plantarflexion, great toe extension, toe flexion    Sensation:    grossly intact throughout lower extremities      Radiology:  Independent visualization of images performed.    RIGHT FOOT THREE OR MORE VIEWS   3/29/2018 9:41 AM      HISTORY:  Right foot pain.     COMPARISON: None.      FINDINGS: Minimal enthesopathic spurring is seen at the Achilles  tendon insertion into and at the plantar aponeurosis origin at the  calcaneus. There is mild soft tissue swelling along the dorsal aspect  of the foot. No fracture or malalignment.          IMPRESSION:   1. Minimal enthesopathic changes of the calcaneus.  2. Probable mild soft tissue swelling along the dorsal aspect of the  foot.  3. No fracture or malalignment.     SHANTAL BRYAN MD    Assessment:  1. Chronic left-sided low back pain without sciatica    2. Right foot pain        Plan:  Discussed the assessment with the patient and developed a plan together:  -Rehab: [phone]. Please do 5-6 days of exercises per week between formal sessions and the home exercises they provide.  -Ice or heat 15-20 minutes as needed (Avoid sleeping on a heating pad or ice)  -Patient's preferred over the counter medications as directed on packaging as needed for pain or soreness.  Please take ibuprofen with food. Do not premedicate prior to activity.  -Referal to orthotist for custom foot orthotics    -We also discussed other future treatment options:  Lumbar spine MRI    Follow Up: 6-8 weeks or sooner if symptoms fail to improve or worsen. Please call with any questions or concerns.     Patricia Bartlett MD, CAQ  [facility]      Again, thank you for allowing me to participate in the care of your patient.        Sincerely,        Xenia Bartlett MD

## 2018-03-29 NOTE — PROGRESS NOTES
Sports Medicine Clinic Visit    PCP: Betzaida Cruz    CC: Patient presents with:  Back Pain      HPI:  Marychuy Gomes is a 47 year old female who is seen in consultation at the request of Betzaida Cruz.   She notes left midline low back pain pain that began last July when she fell down a flight of stairs. She also notes right lateral midfoot pain in combination.  She rates the pain at a 5/10 at its worst and a 2/10 currently.  Symptoms are relieved with ibuprofen, Robaxin (not taking currently), and yoga. Symptoms are worsened by running, with pre menstrual symptoms. She endorses back pain.   She denies swelling, bruising, popping, grinding, catching, locking, instability, numbness, tingling, weakness, pain in other joints and fever, chills.  Other treatment has included chiropractic care (4 months- Sept-Dec, some relief), hot baths. She notes difficulty with exercising.       Review of Systems:  Musculoskeletal: as above  Remainder of review of systems is negative including constitutional, eyes, ENT, CV, pulmonary, GI, , endocrine, skin, hematologic, and neurologic except as noted in HPI or medical history.    History reviewed. No pertinent past surgical/medical/family/social history other than as mentioned in HPI.    Past Medical History:   Diagnosis Date     Cervical high risk HPV (human papillomavirus) test positive 5/17/16 5/17/16 NIL/+ HR HPV other.      Past Surgical History:   Procedure Laterality Date     SURGICAL HISTORY OF -   1993    exploratory lap     Family History   Problem Relation Age of Onset     CEREBROVASCULAR DISEASE Father      Pancreatic Cancer Maternal Grandfather      Prostate Cancer Paternal Uncle      Social History     Social History     Marital status:      Spouse name: Duane     Number of children: 0     Years of education: 12     Occupational History      [employer]- accounting     Social History Main Topics     Smoking status: Never Smoker      "Smokeless tobacco: Never Used     Alcohol use No     Drug use: No     Sexual activity: Yes     Partners: Male     Birth control/ protection: None     Other Topics Concern     Caffeine Concern No     Sleep Concern No     Stress Concern No     Weight Concern No     Exercise Yes     Seat Belt Yes     Social History Narrative       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      Allergies   Allergen Reactions     Codeine Nausea and Vomiting         Objective:  /74  Ht 5' 5.35\" (1.66 m)  Wt 156 lb 12.8 oz (71.1 kg)  LMP 03/21/2018  BMI 25.81 kg/m2    General: Alert and in no distress    Head: Normocephalic, atraumatic  Eyes: no scleral icterus or conjunctival erythema   Oropharynx:  Mucous membranes moist  Skin: no erythema, petechiae, or jaundice  CV: regular rhythm by palpation, 2+ distal pulses  Resp: normal respiratory effort without conversational dyspnea   Psych: normal mood and affect    Gait: Feet slightly abducted with ambulation  Neuro: Motor strength and sensation as noted below    Musculoskeletal:    Low back exam:    Inspection:     no visible deformity in the low back       normal skin       normal vascular       normal lymphatic       no asymmetry    Foot Inspection: Right dorsolateral foot swelling    Palpation:  Tender over the left mid lumbar region just lateral to midline    ROM: Full lumbar flexion, extension, rotation, and lateral flexion.  Right lateral flexion is painful.    Strength:  5/5 hip flexion/abduction/adduction, knee flexion/extension, ankle dorsiflexion/plantarflexion, great toe extension, toe flexion    Sensation:    grossly intact throughout lower extremities      Radiology:  Independent visualization of images performed.    RIGHT FOOT THREE OR MORE VIEWS   " 3/29/2018 9:41 AM      HISTORY:  Right foot pain.     COMPARISON: None.      FINDINGS: Minimal enthesopathic spurring is seen at the Achilles  tendon insertion into and at the plantar aponeurosis origin at the  calcaneus. There is mild soft tissue swelling along the dorsal aspect  of the foot. No fracture or malalignment.          IMPRESSION:   1. Minimal enthesopathic changes of the calcaneus.  2. Probable mild soft tissue swelling along the dorsal aspect of the  foot.  3. No fracture or malalignment.     SHANTAL BRYAN MD    Assessment:  1. Chronic left-sided low back pain without sciatica    2. Right foot pain        Plan:  Discussed the assessment with the patient and developed a plan together:  -Rehab: [facility] - [phone]. Please do 5-6 days of exercises per week between formal sessions and the home exercises they provide.  -Ice or heat 15-20 minutes as needed (Avoid sleeping on a heating pad or ice)  -Patient's preferred over the counter medications as directed on packaging as needed for pain or soreness.  Please take ibuprofen with food. Do not premedicate prior to activity.  -Referal to orthotist for custom foot orthotics    -We also discussed other future treatment options:  Lumbar spine MRI    Follow Up: 6-8 weeks or sooner if symptoms fail to improve or worsen. Please call with any questions or concerns.     Patricia Bartlett MD, CAQ  [facility]

## 2018-03-29 NOTE — PATIENT INSTRUCTIONS
Today's Plan of Care:  Rehab: [facility] [phone]. Please do 5-6 days of exercises per week between formal sessions and the home exercises they provide.  -Ice or heat 15-20 minutes as needed (Avoid sleeping on a heating pad or ice)  -Patient's preferred over the counter medications as directed on packaging as needed for pain or soreness.  Please take ibuprofen with food. Do not premedicate prior to activity.  -Referal to orthotics  -Will call with xray results of right foot.       -We also discussed other future treatment options:  Lumbar spine MRI    Follow Up: 6-8 weeks or sooner if symptoms fail to improve or worsen. Please call with any questions or concerns.

## 2018-03-30 LAB
FINAL DIAGNOSIS: NORMAL
HPV HR 12 DNA CVX QL NAA+PROBE: NEGATIVE
HPV16 DNA SPEC QL NAA+PROBE: NEGATIVE
HPV18 DNA SPEC QL NAA+PROBE: NEGATIVE
SPECIMEN DESCRIPTION: NORMAL
SPECIMEN SOURCE CVX/VAG CYTO: NORMAL

## 2018-04-11 ENCOUNTER — HOSPITAL ENCOUNTER (OUTPATIENT)
Dept: PHYSICAL THERAPY | Facility: OTHER | Age: 48
Setting detail: THERAPIES SERIES
End: 2018-04-11
Attending: PHYSICAL MEDICINE & REHABILITATION
Payer: COMMERCIAL

## 2018-04-11 PROCEDURE — 97530 THERAPEUTIC ACTIVITIES: CPT | Mod: GP | Performed by: PHYSICAL THERAPIST

## 2018-04-11 PROCEDURE — 40000718 ZZHC STATISTIC PT DEPARTMENT ORTHO VISIT: Performed by: PHYSICAL THERAPIST

## 2018-04-11 PROCEDURE — 97110 THERAPEUTIC EXERCISES: CPT | Mod: GP | Performed by: PHYSICAL THERAPIST

## 2018-04-11 PROCEDURE — 97162 PT EVAL MOD COMPLEX 30 MIN: CPT | Mod: GP | Performed by: PHYSICAL THERAPIST

## 2018-04-16 NOTE — PROGRESS NOTES
04/11/18 1415   General Information   Type of Visit Initial OP Ortho PT Evaluation   Start of Care Date 04/11/18   Referring Physician Dr. Bartlett   Patient/Family Goals Statement To get back on an exercise regimen, to get back to normal. To get back to walking.    Orders Evaluate and Treat   Orders Comment None   Date of Order 03/29/18   Insurance Type [employer]   Insurance Comments/Visits Authorized No restrictions   Medical Diagnosis Chronic left sided low back pain   Surgical/Medical history reviewed Yes   Precautions/Limitations no known precautions/limitations   Weight-Bearing Status - LUE full weight-bearing   Weight-Bearing Status - RUE full weight-bearing   Weight-Bearing Status - LLE full weight-bearing   Weight-Bearing Status - RLE full weight-bearing   Body Part(s)   Body Part(s) Lumbar Spine/SI   Presentation and Etiology   Pertinent history of current problem (include personal factors and/or comorbidities that impact the POC) Marychuy reports that last July she was going down her step and tripped and fell down her steps, the step hit her left low back, she had and still has a lump back there. Since that time she has also developed right foot pain.    Impairments A. Pain;H. Impaired gait;B. Decreased WB tolerance;D. Decreased ROM;E. Decreased flexibility   Functional Limitations perform activities of daily living;perform required work activities;perform desired leisure / sports activities   Symptom Location Low back and right foot.    How/Where did it occur With a fall   Onset date of current episode/exacerbation 07/11/17   Chronicity Chronic   Pain rating (0-10 point scale) Other   Pain rating comment Low back is a 2/10 ave. and 4/10 at worst and the right foot is a 2/10 ave. and 7/10 at worst.    Pain quality A. Sharp;B. Dull;C. Aching;D. Burning   Frequency of pain/symptoms D. Other   Pain frequency comment The low back is present 10% of the day and the foot is present 100% of the day.     Pain/symptoms are: Other   Pain symptoms comment Get worse as the day progresses   Pain/symptoms exacerbated by A. Sitting;B. Walking;C. Lifting;D. Carrying;G. Certain positions;J. ADL;K. Home tasks;L. Work tasks   Pain/symptoms eased by C. Rest  (Sitting helps the foot, standing/walking helps the low back.)   Progression of symptoms since onset: Unchanged   Current / Previous Interventions   Diagnostic Tests: X-ray   X-ray Results unremarkable   Prior Level of Function   Prior Level of Function-Mobility Moderaly active   Prior Level of Function-ADLs Independent   Functional Level Prior Comment Likes to walk for exercise, hasn't been able to do due to foot pain.    Current Level of Function   Current Community Support Family/friend caregiver   Patient role/employment history A. 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    Living environment House/townhome   Home/community accessibility 4 level home, stairs not really a concern. Drives to [place] for work.    Current equipment-Gait/Locomotion None   Current equipment-ADL None   Fall Risk Screen   Fall screen completed by PT   Have you fallen 2 or more times in the past year? No   Have you fallen and had an injury in the past year? Yes   Is patient a fall risk? No   Fall screen comments Tripped on a step and hit her back on the step.    System Outcome Measures   Outcome Measures Low Back Pain (see Oswestry and Guerrero)   Functional Scales   Functional Scales Other   Other Scales  Marychuy self reports she is functioning at 40-50% of her normal.    Lumbar Spine/SI Objective Findings   Observation Marychuy is here with her  due to back and foot pain. She is very nice and a little anxious about her condition. She demonstrates some mild to moderate pain behaviors. She reports that she is generally healthy, faria not have a history of back of foot pain, recieved some short term relief with chiropractic care and states  her pain started in her low back after a fall on the stairs where she actually hit her low back, about a month later she started experiencing pain into the right foot and thinks they may be related.    Integumentary Intact   Posture Neutral sitting and standing posture.    Gait/Locomotion Non-antalgic gait pattern.    Balance/Proprioception (Single Leg Stance) NT   Flexion ROM WNL   Extension ROM WNL   Lumbar/SI Special Tests Comments Directional preference assessed using Static/Dynamic Force analysis. Starting symptoms in standing: Low back 0, right foot 1/10 at the lateral heel. Repeated flexion in standing produces low back 2/10, foot remains a 1/10. Repeated extension in standing also produces ankle pain 3/10, low back remains a 2/10 and the heel a 1/10. Hooklying the low back decreases to a 1.5/10, ankle 2/10 and the low back is a 1/10. No change with knees to chest. Prone over a pillow decreases the heel to a 1/10 and the low back to a 1/10, ankle is a 1/10.    Planned Therapy Interventions   Planned Therapy Interventions joint mobilization;manual therapy;neuromuscular re-education;ROM   Planned Therapy Interventions Comment Directional preference   Planned Modality Interventions   Planned Modality Interventions Comments Will use modalities as needed for symptom control such as IFC and U.S.    Clinical Impression   Criteria for Skilled Therapeutic Interventions Met yes, treatment indicated   PT Diagnosis Mechanical low back pain with radiating right foot symptoms, decreased function.    Influenced by the following impairments Pain   Functional limitations due to impairments Bending, walking, standing, lifting,sleeping.    Clinical Presentation Evolving/Changing   Clinical Presentation Rationale Clinical judgement, not able to abolish symptoms today.    Clinical Decision Making (Complexity) Moderate complexity   Therapy Frequency 1 time/week   Predicted Duration of Therapy Intervention (days/wks) 10 visits    Risk & Benefits of therapy have been explained Yes   Patient, Family & other staff in agreement with plan of care Yes   Clinical Impression Comments Marychuy is a 47 year old female who is referred to P.T. with chronic left sided low back pain. She states that initially her low back hurt after the fall and then about a month later she started noticing right foot pain and is suspicious that it may be connected to her low back. She is frustrated and concerned with her lack of progress. She also reports a history of tendonitis to bilateral knees. 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    Education Assessment   Preferred Learning Style Listening;Demonstration;Reading   Barriers to Learning No barriers   ORTHO GOALS   PT Ortho Eval Goals 1;2;3   Ortho Goal 1   Goal Identifier Pain   Goal Description Marychuy will use strategies learned in P.T. to decrease pain levels by 50%    Target Date 05/11/18   Ortho Goal 2   Goal Identifier Walking   Goal Description Marychuy will return to walking for exercise without pain in her foot.    Target Date 06/10/18   Ortho Goal 3   Goal Identifier Function   Goal Description Marychuy will demonstrate full symptom free ROM and improve her JAYANT by 30% indicating improved overall function   Target Date 07/10/18   Total Evaluation Time   Total Evaluation Time 30

## 2018-04-16 NOTE — ADDENDUM NOTE
Encounter addended by: Angela Allen, PT on: 4/16/2018 11:56 AM<BR>     Actions taken: Sign clinical note, Flowsheet accepted

## 2018-04-17 ENCOUNTER — TELEPHONE (OUTPATIENT)
Dept: OBGYN | Facility: OTHER | Age: 48
End: 2018-04-17

## 2018-04-17 ENCOUNTER — HOSPITAL ENCOUNTER (OUTPATIENT)
Dept: PHYSICAL THERAPY | Facility: OTHER | Age: 48
Setting detail: THERAPIES SERIES
End: 2018-04-17
Attending: PHYSICAL MEDICINE & REHABILITATION
Payer: COMMERCIAL

## 2018-04-17 PROCEDURE — 97110 THERAPEUTIC EXERCISES: CPT | Mod: GP | Performed by: PHYSICAL THERAPIST

## 2018-04-17 PROCEDURE — 40000718 ZZHC STATISTIC PT DEPARTMENT ORTHO VISIT: Performed by: PHYSICAL THERAPIST

## 2018-04-17 PROCEDURE — 97112 NEUROMUSCULAR REEDUCATION: CPT | Mod: GP | Performed by: PHYSICAL THERAPIST

## 2018-04-17 PROCEDURE — 97530 THERAPEUTIC ACTIVITIES: CPT | Mod: GP | Performed by: PHYSICAL THERAPIST

## 2018-04-17 NOTE — TELEPHONE ENCOUNTER
Reason for Call:  Same Day Appointment, Requested Provider:  any OB in [place]     PCP: Betzaida Cruz    Reason for visit: endometrial biopsy     Duration of symptoms: NA     Have you been treated for this in the past? No    Additional comments: Pt states she needs this done in the next 10-14 days due to her cycle.     Can we leave a detailed message on this number? YES    Phone number patient can be reached at: Home number on file [phone] (home)    Best Time: any     Call taken on 4/17/2018 at 8:07 AM by Luci York

## 2018-04-17 NOTE — TELEPHONE ENCOUNTER
Scheduled and notified patient of appointment with Dr Larson for 04/27/18 in the [facility].

## 2018-04-24 ENCOUNTER — HOSPITAL ENCOUNTER (OUTPATIENT)
Dept: PHYSICAL THERAPY | Facility: OTHER | Age: 48
Setting detail: THERAPIES SERIES
End: 2018-04-24
Attending: PHYSICAL MEDICINE & REHABILITATION
Payer: COMMERCIAL

## 2018-04-24 PROCEDURE — 97110 THERAPEUTIC EXERCISES: CPT | Mod: GP | Performed by: PHYSICAL THERAPIST

## 2018-04-24 PROCEDURE — 97530 THERAPEUTIC ACTIVITIES: CPT | Mod: GP | Performed by: PHYSICAL THERAPIST

## 2018-04-24 PROCEDURE — 40000718 ZZHC STATISTIC PT DEPARTMENT ORTHO VISIT: Performed by: PHYSICAL THERAPIST

## 2018-04-27 ENCOUNTER — OFFICE VISIT (OUTPATIENT)
Dept: OBGYN | Facility: OTHER | Age: 48
End: 2018-04-27
Payer: COMMERCIAL

## 2018-04-27 VITALS
DIASTOLIC BLOOD PRESSURE: 80 MMHG | HEART RATE: 82 BPM | SYSTOLIC BLOOD PRESSURE: 120 MMHG | BODY MASS INDEX: 26.01 KG/M2 | WEIGHT: 158 LBS

## 2018-04-27 DIAGNOSIS — N92.0 EXCESSIVE OR FREQUENT MENSTRUATION: Primary | ICD-10-CM

## 2018-04-27 LAB — BETA HCG QUAL IFA URINE: NEGATIVE

## 2018-04-27 PROCEDURE — 99204 OFFICE O/P NEW MOD 45 MIN: CPT | Mod: 52 | Performed by: OBSTETRICS & GYNECOLOGY

## 2018-04-27 PROCEDURE — 58100 BIOPSY OF UTERUS LINING: CPT | Performed by: OBSTETRICS & GYNECOLOGY

## 2018-04-27 PROCEDURE — 84703 CHORIONIC GONADOTROPIN ASSAY: CPT | Performed by: OBSTETRICS & GYNECOLOGY

## 2018-04-27 ASSESSMENT — PAIN SCALES - GENERAL: PAINLEVEL: NO PAIN (0)

## 2018-04-27 NOTE — PROGRESS NOTES
Subjective  47 year old non-pregnant female presents today for an endometrial biopsy.  Patient had a pap smear done on 3/26/18 which showed endometrial cells.  Patient states she was on Day #7 of her cycle.  We discussed this in detail.  Patient states she has been having shorter, more frequent menses and heavier as well.  She states this has been going on for a few months.  We discussed doing an endometrial biopsy for this reason today.  I also recommended a pelvic ultrasound.  Patient has not had any children and states she has had infertility issues.          ROS: 10 point ROS neg other than the symptoms noted above in the HPI.  Past Medical History:   Diagnosis Date     Cervical high risk HPV (human papillomavirus) test positive 5/17/16 5/17/16 NIL/+ HR HPV other.      Past Surgical History:   Procedure Laterality Date     SURGICAL HISTORY OF -   1993    exploratory lap     Family History   Problem Relation Age of Onset     CEREBROVASCULAR DISEASE Father      Pancreatic Cancer Maternal Grandfather      Prostate Cancer Paternal Uncle      Social History   Substance Use Topics     Smoking status: Never Smoker     Smokeless tobacco: Never Used     Alcohol use No         Objective  Vitals: /80  Pulse 82  Wt 158 lb (71.7 kg)  BMI 26.01 kg/m2  BMI= Body mass index is 26.01 kg/(m^2).    General appearance=well developed, well-nourished female  Psych=mood is stable  Skin=no rashes or lesions seen  Neck=overall appearance is normal  Thyroid=no enlargement  Lungs=non-labored breathing, no use of accessory muscles   Abd=soft, Nontender/nondistended, +bowel sounds x4, no masses, no signs of hernias, no evidence of hepatosplenomegaly  PELVIC:    External genitalia: normal without lesions or masses  Urethral meatus: no lesions or prolapse noted, normal size  Urethra: no masses, non tender  Bladder: non tender, no fullness  Vagina: normal mucosa and rugae, no discharge.  Cervix: normal without lesion, no cervical  motion tenderness, healthy, nulliparous  Uterus: small, mobile, nontender.  Adnexa: non tender, without masses  Rectal: deffered  Ext=no clubbing or cyanosis, no swelling    Procedure:  Endometrial biopsy    Indication: Menometrorrhagia with age >35                       Discussed risk of bleeding, infection, uterine perforation, cramping pain.  Pt agreed to proceed with procedure after all questions answered.    Speculum placed and cervix visualized.  Endometrial biopsy pipelle unable to be passed through cervix and patient requested I stop.  She did not pass out however required water and cold towels.  No bleeding noted from cervical os.     There were no apparent complications and bleeding was minimal.      Assessment  1.)  Endometrial cells on pap smear consistent with last menstrual period  2.)  Menometrorrhagia      Plan  1.)  Attempted endometrial biopsy  2.)  Schedule pelvic ultrasound  3.)  Follow up to discuss results and treatment options      30 minutes was spent face to face with the patient today discussing her history, diagnosis, and follow-up plan as noted above.  Over 50% of the visit was spent in counseling and coordination of care not including time spent on procedure.        Nursing notes read and reviewed    Yumiko Larson

## 2018-04-27 NOTE — MR AVS SNAPSHOT
After Visit Summary   4/27/2018    Marychuy Gomes    MRN: [id number]           Patient Information     Date Of Birth          1970        Visit Information        Provider Department      4/27/2018 2:45 PM Yumiko Larson DO [facility]        Today's Diagnoses     Excessive or frequent menstruation    -  1      Care Instructions    Please call if you any questions.    [address]   [address]  [phone]        Yumiko Larson            Follow-ups after your visit        Follow-up notes from your care team     Return in about 4 weeks (around 5/25/2018).      Your next 10 appointments already scheduled     May 01, 2018  4:15 PM CDT   Ortho Treatment with Angela M Blenkush, PT   [facility] ([facility]    [address] [place] [address]   [phone]            May 07, 2018  5:15 PM CDT   Ortho Treatment with Angela M Blenkush, PT   [facility] ([facility])    [address]  [place] [address]   [phone]            May 15, 2018  7:30 AM CDT   Ortho Treatment with Angela M Blenkush, PT   [facility] ([facility])    [address] [place] [address]   [phone]            May 22, 2018  7:30 AM CDT   Ortho Treatment with Angela M Blenkush, PT   [facility] ([facility])    [address] [place] [address]   [phone]            May 29, 2018  4:15 PM CDT   Ortho Treatment with Angela M Blenkush, PT   [facility] ([facility])    [address] [place] [address]   [phone]            Jun 05, 2018  7:30 AM CDT   Ortho Treatment with Angela M Blenkush, PT   [facility] ([facility])    [address]  [place] [address]   [phone]              Future tests that were ordered  "for you today     Open Future Orders        Priority Expected Expires Ordered    US Pelvic Complete with Transvaginal Routine  2018            Who to contact     If you have questions or need follow up information about today's clinic visit or your schedule please contact [facility] directly at [phone].  Normal or non-critical lab and imaging results will be communicated to you by MyChart, letter or phone within 4 business days after the clinic has received the results. If you do not hear from us within 7 days, please contact the clinic through [employer] or phone. If you have a critical or abnormal lab result, we will notify you by phone as soon as possible.  Submit refill requests through [employer] or call your pharmacy and they will forward the refill request to us. Please allow 3 business days for your refill to be completed.          Additional Information About Your Visit        MyChart Information     [employer] lets you send messages to your doctor, view your test results, renew your prescriptions, schedule appointments and more. To sign up, go to [email or web address] . Click on \"Log in\" on the left side of the screen, which will take you to the Welcome page. Then click on \"Sign up Now\" on the right side of the page.     You will be asked to enter the access code listed below, as well as some personal information. Please follow the directions to create your username and password.     Your access code is: [email or web address]  Expires: 2018  8:37 AM     Your access code will  in 90 days. If you need help or a new code, please call your [place] clinic or [phone].        Care EveryWhere ID     This is your Care EveryWhere ID. This could be used by other organizations to access your [place] medical records  [id number]        Your Vitals Were     Pulse BMI (Body Mass Index)                82 26.01 kg/m2           Blood Pressure from Last 3 Encounters:   18 120/80 "   03/29/18 112/74   03/26/18 110/72    Weight from Last 3 Encounters:   04/27/18 158 lb (71.7 kg)   03/29/18 156 lb 12.8 oz (71.1 kg)   03/26/18 156 lb 12.8 oz (71.1 kg)              We Performed the Following     Beta HCG qual IFA urine     ENDOMETRIAL BIOPSY W/O CERVICAL DILATION        Primary Care Provider Office Phone # Fax #    Betzaida Cruz PA-C [phone] [address] [place] [address]        Equal Access to Services     [facility]: Hadii ana castillo hadasho Soomaali, waaxda luqadaha, qaybta kaalmada marilyn, cj darden . So [facility] [phone].    ATENCIÓN: Si habla español, tiene a lockwood disposición servicios gratuitos de asistencia lingüística. LlSelect [facility], [place] [phone].    We comply with applicable federal civil rights laws and [place] laws. We do not discriminate on the basis of race, color, national origin, age, disability, sex, sexual orientation, or gender identity.            Thank you!     Thank you for choosing [facility]  for your care. Our goal is always to provide you with excellent care. Hearing back from our patients is one way we can continue to improve our services. Please take a few minutes to complete the written survey that you may receive in the mail after your visit with us. Thank you!             Your Updated Medication List - Protect others around you: Learn how to safely use, store and throw away your medicines at www.disposemymeds.org.          This list is accurate as of 4/27/18  3:20 PM.  Always use your most recent med list.                   Brand Name Dispense Instructions for use Diagnosis    IBUPROFEN PO           NO ACTIVE MEDICATIONS           TYLENOL PO      Take by mouth as needed for mild pain (for pain)

## 2018-04-27 NOTE — PATIENT INSTRUCTIONS
Please call if you any questions.    [address]   [address]  [phone]        Yumiko Larson

## 2018-04-27 NOTE — NURSING NOTE
"Chief Complaint   Patient presents with     Consult     endometrial biopsy, upt needed       Initial /80  Pulse 82  Wt 158 lb (71.7 kg)  BMI 26.01 kg/m2 Estimated body mass index is 26.01 kg/(m^2) as calculated from the following:    Height as of 3/29/18: 5' 5.35\" (1.66 m).    Weight as of this encounter: 158 lb (71.7 kg).  Medication Reconciliation: complete  "

## 2018-05-01 ENCOUNTER — HOSPITAL ENCOUNTER (OUTPATIENT)
Dept: PHYSICAL THERAPY | Facility: OTHER | Age: 48
Setting detail: THERAPIES SERIES
End: 2018-05-01
Attending: PHYSICAL MEDICINE & REHABILITATION
Payer: COMMERCIAL

## 2018-05-01 PROCEDURE — 97530 THERAPEUTIC ACTIVITIES: CPT | Mod: GP | Performed by: PHYSICAL THERAPIST

## 2018-05-01 PROCEDURE — 97112 NEUROMUSCULAR REEDUCATION: CPT | Mod: GP | Performed by: PHYSICAL THERAPIST

## 2018-05-01 PROCEDURE — 97110 THERAPEUTIC EXERCISES: CPT | Mod: GP | Performed by: PHYSICAL THERAPIST

## 2018-05-01 PROCEDURE — 40000718 ZZHC STATISTIC PT DEPARTMENT ORTHO VISIT: Performed by: PHYSICAL THERAPIST

## 2018-05-15 ENCOUNTER — HOSPITAL ENCOUNTER (OUTPATIENT)
Dept: PHYSICAL THERAPY | Facility: OTHER | Age: 48
Setting detail: THERAPIES SERIES
End: 2018-05-15
Attending: PHYSICAL MEDICINE & REHABILITATION
Payer: COMMERCIAL

## 2018-05-15 PROCEDURE — 97530 THERAPEUTIC ACTIVITIES: CPT | Mod: GP | Performed by: PHYSICAL THERAPIST

## 2018-05-15 PROCEDURE — 40000718 ZZHC STATISTIC PT DEPARTMENT ORTHO VISIT: Performed by: PHYSICAL THERAPIST

## 2018-05-15 PROCEDURE — 97110 THERAPEUTIC EXERCISES: CPT | Mod: GP | Performed by: PHYSICAL THERAPIST

## 2018-05-15 PROCEDURE — 97112 NEUROMUSCULAR REEDUCATION: CPT | Mod: GP | Performed by: PHYSICAL THERAPIST

## 2018-05-21 ENCOUNTER — RADIANT APPOINTMENT (OUTPATIENT)
Dept: ULTRASOUND IMAGING | Facility: OTHER | Age: 48
End: 2018-05-21
Payer: COMMERCIAL

## 2018-05-21 DIAGNOSIS — N92.0 EXCESSIVE OR FREQUENT MENSTRUATION: ICD-10-CM

## 2018-05-21 PROCEDURE — 76830 TRANSVAGINAL US NON-OB: CPT

## 2018-05-21 PROCEDURE — 76856 US EXAM PELVIC COMPLETE: CPT

## 2018-05-22 ENCOUNTER — HOSPITAL ENCOUNTER (OUTPATIENT)
Dept: PHYSICAL THERAPY | Facility: OTHER | Age: 48
Setting detail: THERAPIES SERIES
End: 2018-05-22
Attending: PHYSICAL MEDICINE & REHABILITATION
Payer: COMMERCIAL

## 2018-05-22 PROCEDURE — 40000718 ZZHC STATISTIC PT DEPARTMENT ORTHO VISIT: Performed by: PHYSICAL THERAPIST

## 2018-05-22 PROCEDURE — 97110 THERAPEUTIC EXERCISES: CPT | Mod: GP | Performed by: PHYSICAL THERAPIST

## 2018-05-22 PROCEDURE — 97530 THERAPEUTIC ACTIVITIES: CPT | Mod: GP | Performed by: PHYSICAL THERAPIST

## 2018-05-22 PROCEDURE — 97112 NEUROMUSCULAR REEDUCATION: CPT | Mod: GP | Performed by: PHYSICAL THERAPIST

## 2018-05-29 ENCOUNTER — HOSPITAL ENCOUNTER (OUTPATIENT)
Dept: PHYSICAL THERAPY | Facility: OTHER | Age: 48
Setting detail: THERAPIES SERIES
End: 2018-05-29
Attending: PHYSICAL MEDICINE & REHABILITATION
Payer: COMMERCIAL

## 2018-05-29 PROCEDURE — 40000718 ZZHC STATISTIC PT DEPARTMENT ORTHO VISIT: Performed by: PHYSICAL THERAPIST

## 2018-05-29 PROCEDURE — 97110 THERAPEUTIC EXERCISES: CPT | Mod: GP | Performed by: PHYSICAL THERAPIST

## 2018-05-29 PROCEDURE — 97530 THERAPEUTIC ACTIVITIES: CPT | Mod: GP | Performed by: PHYSICAL THERAPIST

## 2018-06-18 ENCOUNTER — OFFICE VISIT (OUTPATIENT)
Dept: OBGYN | Facility: OTHER | Age: 48
End: 2018-06-18
Payer: COMMERCIAL

## 2018-06-18 VITALS
HEART RATE: 88 BPM | BODY MASS INDEX: 26.46 KG/M2 | SYSTOLIC BLOOD PRESSURE: 110 MMHG | DIASTOLIC BLOOD PRESSURE: 70 MMHG | WEIGHT: 160.75 LBS

## 2018-06-18 DIAGNOSIS — R87.810 CERVICAL HIGH RISK HPV (HUMAN PAPILLOMAVIRUS) TEST POSITIVE: ICD-10-CM

## 2018-06-18 DIAGNOSIS — N92.0 EXCESSIVE OR FREQUENT MENSTRUATION: Primary | ICD-10-CM

## 2018-06-18 PROCEDURE — 99214 OFFICE O/P EST MOD 30 MIN: CPT | Performed by: OBSTETRICS & GYNECOLOGY

## 2018-06-18 RX ORDER — HYDROCODONE BITARTRATE AND ACETAMINOPHEN 5; 325 MG/1; MG/1
TABLET ORAL
COMMUNITY
Start: 2017-07-21 | End: 2020-07-03

## 2018-06-18 RX ORDER — METHOCARBAMOL 750 MG/1
TABLET, FILM COATED ORAL
COMMUNITY
Start: 2017-07-21 | End: 2020-07-03

## 2018-06-18 NOTE — PROGRESS NOTES
Subjective  47 year old non-pregnant female presents today to discuss her ultrasound results.  Patient is here with her .  Patient was seen by me in April for an endometrial biopsy due to endometrial cells on her pap smear.  These endometrial cells were consistent with a recent menses.  We were unable to do the endometrial biopsy due to severe pain.  No children.  An ultrasound was done to look at the endometrial lining which showed 2 small fibroids.  Patient states her menses have become closer together.  Her last menstrual period was yesterday.  She bleeds for 7 days.  Patient uses pads and has to change them frequently due to heavy vaginal bleeding.  She may have to change it very frequently almost in 30mins or so due to the large amounts.  She passes large clots.  Every other month is heavy with clots.  Some dizziness and lightheadedness.  She does get headaches during her menses.  No problems urinating.  Normal bowel movements.  Patient is sexually active.  No dyspareunia.  No vaginal spotting after.  We discussed her ultrasound in detail.  She is not wanting treatment at this time for her menorrhagia.  We also discussed her history of HPV in detail as well.  I recommended a repeat ultrasound in 6 months to a year.          ROS: 10 point ROS neg other than the symptoms noted above in the HPI.  Past Medical History:   Diagnosis Date     Cervical high risk HPV (human papillomavirus) test positive 5/17/16 5/17/16 NIL/+ HR HPV other.      Past Surgical History:   Procedure Laterality Date     SURGICAL HISTORY OF -   1993    exploratory lap     Family History   Problem Relation Age of Onset     CEREBROVASCULAR DISEASE Father      Pancreatic Cancer Maternal Grandfather      Prostate Cancer Paternal Uncle      Social History   Substance Use Topics     Smoking status: Never Smoker     Smokeless tobacco: Never Used     Alcohol use No         Objective  Vitals: /70 (Patient Position: Sitting, Cuff Size:  Adult Regular)  Pulse 88  Wt 160 lb 12 oz (72.9 kg)  LMP 06/17/2018 (Exact Date)  BMI 26.46 kg/m2  BMI= Body mass index is 26.46 kg/(m^2).    General appearance=well developed, well-nourished female  Psych=mood is stable      Pelvic ultrasound=5/21/18:  FINDINGS:  The uterus measures 7.7 x 4.6 x 6.4 cm. Myometrial fibroid  in the posterior uterine body measures 1.4 x 1.2 x 1.5 cm. Myometrial  fibroid in the anterior uterine body measures 0.8 x 0.7 x 0.8 cm.  Endometrium measures up to 1.4 cm in thickness and is grossly normal  in echogenicity for a premenopausal female. There is an indentation in  the superior aspect endometrial stripe of the uterine fundus without  an indentation in the fundal myometrial contour likely representing  arcuate uterus. No endometrial abnormalities are seen.     The ovaries measure 3.1 x 1.6 x 1.2 cm on the right and 2.7 x 1.6 x  2.2 cm on the left. Cyst in the left ovary measuring 1.5 x 1.0 x 1.2  cm likely represents a dominant follicle.     Small amount of free fluid is seen in the posterior caudal sac.         IMPRESSION:  1. Fibroid uterus. These are myometrial and not submucosal in  location.  2. Question mild arcuate shape of the endometrial cavity. Endometrium  is otherwise unremarkable.  3. Probable dominant follicle in the left ovary.  4. Physiologic amount of free fluid in pelvis.  5. Etiology for patient's symptoms is otherwise not seen.      Assessment  1.)  Uterine fibroids  2.)  Menorrhagia  3.)  HPV  4.)  Endometrial cells on pap smear  5.)  Cervical os stenosis      Plan  1.)  Repeat pelvic ultrasound  In 6 months-one year  2.)  Follow up to discuss treatment options when ready      30 minutes was spent face to face with the patient today discussing her history, diagnosis, and follow-up plan as noted above.  Over 50% of the visit was spent in counseling and coordination of care.        Nursing notes read and reviewed    Yumiko Larson

## 2018-06-18 NOTE — MR AVS SNAPSHOT
After Visit Summary   6/18/2018    Marychuy Gomes    MRN: [id number]           Patient Information     Date Of Birth          1970        Visit Information        Provider Department      6/18/2018 2:30 PM Yumiko Larson,  [facility]        Today's Diagnoses     Excessive or frequent menstruation    -  1    Cervical high risk HPV (human papillomavirus) test positive          Care Instructions    Please call if you any questions.    [facility]  [address] [place]   [address]  [phone]        Yumiko Larson            Follow-ups after your visit        Follow-up notes from your care team     Return in about 6 months (around 12/18/2018).      Your next 10 appointments already scheduled     Jun 19, 2018 11:15 AM CDT   Ortho Treatment with Angela Allen, BECKY   [facility]    [address] [facility] [address] [phone]              Future tests that were ordered for you today     Open Future Orders        Priority Expected Expires Ordered    US Pelvic Complete with Transvaginal Routine  12/31/2018 6/18/2018            Who to contact     If you have questions or need follow up information about today's clinic visit or your schedule please contact [facility] directly at [phone].  Normal or non-critical lab and imaging results will be communicated to you by MyChart, letter or phone within 4 business days after the clinic has received the results. If you do not hear from us within 7 days, please contact the clinic through MyChart or phone. If you have a critical or abnormal lab result, we will notify you by phone as soon as possible.  Submit refill requests through [employer] or call your pharmacy and they will forward the refill request to us. Please allow 3 business days for your refill to be completed.          Additional Information About Your Visit        Care  EveryWhere ID     This is your Care EveryWhere ID. This could be used by other organizations to access your [place] medical records  [id number]        Your Vitals Were     Pulse Last Period BMI (Body Mass Index)             88 06/17/2018 (Exact Date) 26.46 kg/m2          Blood Pressure from Last 3 Encounters:   06/18/18 110/70   04/27/18 120/80   03/29/18 112/74    Weight from Last 3 Encounters:   06/18/18 160 lb 12 oz (72.9 kg)   04/27/18 158 lb (71.7 kg)   03/29/18 156 lb 12.8 oz (71.1 kg)              Information about OPIOIDS     PRESCRIPTION OPIOIDS: WHAT YOU NEED TO KNOW   We gave you an opioid (narcotic) pain medicine. It is important to manage your pain, but opioids are not always the best choice. You should first try all the other options your care team gave you. Take this medicine for as short a time (and as few doses) as possible.     These medicines have risks:    DO NOT drive when on new or higher doses of pain medicine. These medicines can affect your alertness and reaction times, and you could be arrested for driving under the influence (DUI). If you need to use opioids long-term, talk to your care team about driving.    DO NOT operate heave machinery    DO NOT do any other dangerous activities while taking these medicines.     DO NOT drink any alcohol while taking these medicines.      If the opioid prescribed includes acetaminophen, DO NOT take with any other medicines that contain acetaminophen. Read all labels carefully. Look for the word  acetaminophen  or  Tylenol.  Ask your pharmacist if you have questions or are unsure.    You can get addicted to pain medicines, especially if you have a history of addiction (chemical, alcohol or substance dependence). Talk to your care team about ways to reduce this risk.    Store your pills in a secure place, locked if possible. We will not replace any lost or stolen medicine. If you don t finish your medicine, please throw away (dispose) as directed by  your pharmacist. The [place] Pollution Control Agency has more information about safe disposal: https://www.pca.[facility].[place].us/living-green/managing-unwanted-medications.     All opioids tend to cause constipation. Drink plenty of water and eat foods that have a lot of fiber, such as fruits, vegetables, prune juice, apple juice and high-fiber cereal. Take a laxative (Miralax, milk of magnesia, Colace, Senna) if you don t move your bowels at least every other day.          Primary Care Provider Office Phone # Fax #    Betzaida Cruz PA-C [phone] [address] [place] [address]        Equal Access to Services     ANNE-MARIE BOWDEN : Darinel Merritt, wauche suazo, qadavid kaalmadouglas sanders, cj vicente. So [facility] [phone].    ATENCIÓN: Si habla español, tiene a lockwood disposición servicios gratuitos de asistencia lingüística. Llame al [phone].    We comply with applicable federal civil rights laws and [place] laws. We do not discriminate on the basis of race, color, national origin, age, disability, sex, sexual orientation, or gender identity.            Thank you!     Thank you for choosing [facility]  for your care. Our goal is always to provide you with excellent care. Hearing back from our patients is one way we can continue to improve our services. Please take a few minutes to complete the written survey that you may receive in the mail after your visit with us. Thank you!             Your Updated Medication List - Protect others around you: Learn how to safely use, store and throw away your medicines at www.disposemymeds.org.          This list is accurate as of 6/18/18  4:16 PM.  Always use your most recent med list.                   Brand Name Dispense Instructions for use Diagnosis    HYDROcodone-acetaminophen 5-325 MG per tablet    NORCO          IBUPROFEN PO           methocarbamol 750 MG tablet    ROBAXIN           NO ACTIVE MEDICATIONS           TYLENOL PO      Take by mouth as needed for mild pain (for pain)

## 2018-06-19 ENCOUNTER — HOSPITAL ENCOUNTER (OUTPATIENT)
Dept: PHYSICAL THERAPY | Facility: OTHER | Age: 48
Setting detail: THERAPIES SERIES
End: 2018-06-19
Attending: PHYSICIAN ASSISTANT
Payer: COMMERCIAL

## 2018-06-19 PROCEDURE — 40000718 ZZHC STATISTIC PT DEPARTMENT ORTHO VISIT: Performed by: PHYSICAL THERAPIST

## 2018-06-19 PROCEDURE — 97530 THERAPEUTIC ACTIVITIES: CPT | Mod: GP | Performed by: PHYSICAL THERAPIST

## 2018-06-19 PROCEDURE — 97110 THERAPEUTIC EXERCISES: CPT | Mod: GP | Performed by: PHYSICAL THERAPIST

## 2018-07-17 ENCOUNTER — HOSPITAL ENCOUNTER (OUTPATIENT)
Dept: PHYSICAL THERAPY | Facility: OTHER | Age: 48
Setting detail: THERAPIES SERIES
End: 2018-07-17
Attending: PHYSICAL MEDICINE & REHABILITATION
Payer: COMMERCIAL

## 2018-07-17 PROCEDURE — 97112 NEUROMUSCULAR REEDUCATION: CPT | Mod: GP | Performed by: PHYSICAL THERAPIST

## 2018-07-17 PROCEDURE — 40000718 ZZHC STATISTIC PT DEPARTMENT ORTHO VISIT: Performed by: PHYSICAL THERAPIST

## 2018-07-17 PROCEDURE — 97110 THERAPEUTIC EXERCISES: CPT | Mod: GP | Performed by: PHYSICAL THERAPIST

## 2018-07-17 PROCEDURE — 97530 THERAPEUTIC ACTIVITIES: CPT | Mod: GP | Performed by: PHYSICAL THERAPIST

## 2018-08-07 ENCOUNTER — HOSPITAL ENCOUNTER (OUTPATIENT)
Dept: PHYSICAL THERAPY | Facility: OTHER | Age: 48
Setting detail: THERAPIES SERIES
End: 2018-08-07
Attending: PHYSICAL MEDICINE & REHABILITATION
Payer: COMMERCIAL

## 2018-08-07 PROCEDURE — 97530 THERAPEUTIC ACTIVITIES: CPT | Mod: GP | Performed by: PHYSICAL THERAPIST

## 2018-08-07 PROCEDURE — 40000718 ZZHC STATISTIC PT DEPARTMENT ORTHO VISIT: Performed by: PHYSICAL THERAPIST

## 2018-08-07 PROCEDURE — 97110 THERAPEUTIC EXERCISES: CPT | Mod: GP | Performed by: PHYSICAL THERAPIST

## 2018-10-25 NOTE — ADDENDUM NOTE
Encounter addended by: Angela Allen, PT on: 10/25/2018  9:19 AM<BR>     Actions taken: Episode resolved

## 2018-10-25 NOTE — PROGRESS NOTES
Outpatient Physical Therapy Discharge Note     Patient: Marychuy Gomes  : 1970    Beginning/End Dates of Reporting Period:  2018 to 2018  Marychuy has been seen for a total of 10 visits overall.    Referring Provider: Dr. Bartlett    Therapy Diagnosis: Mechanical low back pain with radiating lower extremity pain, resolving symptoms with home exercise program.     Client Self Report: Has not had symptoms to the toe since the last time she was here 3 weeks ago. She states her distal symptom is now to the ball of her foot and is a 1/10 present 80% of the day. She reports that there are symptoms to her foot 100% of the day.     Objective Measurements:  Objective Measure: JAYANT, KsT  Details: JAYANT 26%, KsT 3/5    Objective Measure: Frequency of PREP  Details: Every 3 hours    Objective Measure: Effect of the PREP  Details: Will consistently move symptoms up in the foot from the ball to the heel.     Objective Measure: Symptoms upon awakening  Details: Ball of the foot 1/10.     Objective Measure: Frequency of distal symptoms.   Details: Now to the ball of the foot 80% of the day.         Goals:  Goal Identifier Pain   Goal Description Marychuy will use strategies learned in P.T. to decrease pain levels by 50%    Target Date 18   Date Met   2018   Progress:     Goal Identifier Walking   Goal Description Marychuy will return to walking for exercise without pain in her foot.    Target Date 06/10/18   Date Met      Progress:                                  Meeting, has returned to a slow walking program.     Goal Identifier Function   Goal Description Marychuy will demonstrate full symptom free ROM and improve her JAYANT by 30% indicating improved overall function   Target Date 07/10/18   Date Met      Progress:     Progress Toward Goals:   Progress this reporting period: Marychuy has met the first goal, is working toward meeting the other 2 goals.     Plan:  Discharge from  therapy.    Discharge:   Yes    Reason for Discharge: Patient has failed to schedule further appointments.  Independent with home program.    Equipment Issued: None    Discharge Plan: Patient to continue home program.    Thank you for this referral.    Angela Allen P.T.

## 2018-10-25 NOTE — ADDENDUM NOTE
Encounter addended by: Angela Allen PT on: 10/25/2018  9:19 AM<BR>     Actions taken: Sign clinical note

## 2020-07-03 ENCOUNTER — OFFICE VISIT (OUTPATIENT)
Dept: FAMILY MEDICINE | Facility: OTHER | Age: 50
End: 2020-07-03
Payer: COMMERCIAL

## 2020-07-03 VITALS
HEART RATE: 104 BPM | WEIGHT: 158.8 LBS | HEIGHT: 65 IN | TEMPERATURE: 97.1 F | DIASTOLIC BLOOD PRESSURE: 78 MMHG | RESPIRATION RATE: 16 BRPM | BODY MASS INDEX: 26.46 KG/M2 | SYSTOLIC BLOOD PRESSURE: 138 MMHG

## 2020-07-03 DIAGNOSIS — L40.9 PSORIASIS: ICD-10-CM

## 2020-07-03 DIAGNOSIS — L03.811 CELLULITIS OF HEAD OR SCALP: Primary | ICD-10-CM

## 2020-07-03 PROCEDURE — 99214 OFFICE O/P EST MOD 30 MIN: CPT | Performed by: FAMILY MEDICINE

## 2020-07-03 RX ORDER — CEPHALEXIN 500 MG/1
500 CAPSULE ORAL 3 TIMES DAILY
Qty: 21 CAPSULE | Refills: 0 | Status: SHIPPED | OUTPATIENT
Start: 2020-07-03 | End: 2020-07-10

## 2020-07-03 RX ORDER — FLUOCINONIDE 0.5 MG/G
CREAM TOPICAL 2 TIMES DAILY
Qty: 60 G | Refills: 1 | Status: SHIPPED | OUTPATIENT
Start: 2020-07-03 | End: 2022-08-19

## 2020-07-03 RX ORDER — FLUOCINONIDE TOPICAL SOLUTION USP, 0.05% 0.5 MG/ML
SOLUTION TOPICAL 2 TIMES DAILY PRN
Qty: 60 ML | Refills: 1 | Status: SHIPPED | OUTPATIENT
Start: 2020-07-03 | End: 2022-08-19

## 2020-07-03 ASSESSMENT — MIFFLIN-ST. JEOR: SCORE: 1351.74

## 2020-07-03 ASSESSMENT — PAIN SCALES - GENERAL: PAINLEVEL: MODERATE PAIN (4)

## 2020-07-03 NOTE — PATIENT INSTRUCTIONS
"Thank you for visiting [facility]    Take the antibiotics until they're gone.    Once it's no longer painful, you can start using the topical solution on your scalp to help with itching/rash.    Can also use the cream on your elbow/ear.    I'd recommend trying hydrocortisone for your face lesions first.     If not responding, we can take some other treatment approaches.      As a reminder, psoriasis is managed, not cured.    Contact us or return if questions or concerns.     Have a nice day!    Dr. Stanley     No follow-ups on file.      If you had imaging scheduled please refer to your radiology prep sheet.      If you need medication refills, please contact your pharmacy 3 days before your prescriptions runs out or download the [facility] gurpreet for your smart phone.   If you are out of refills, your pharmacy will contact contact the clinic.    Contact us or return if questions or concerns.     -Your [facility] Care Team:    MD Betzaida Maxwell PA-C Joel De Haan, PA-C Anna Niesen, PA-C Elizabeth \"Loli\" HARITHA Prado CNP  HARITHA Acosta, HARITHA Wagner, EMILY Gunter, RN, BSN       General information about your   [facility]      Clinic hours:     Lab hours:  Phone [phone]  Monday 7:30 am-7 pm    Monday 8:30 am-6:30 pm  Tuesday-Friday 7:30 am-5 pm   Tuesday-Friday 8:30 am-4:30 pm    Pharmacy hours:  Phone [phone]  Monday 8:30 am-7pm  Tuesday-Friday 8:30am-6 pm                                     Mychart assistance [phone]    We would like to hear from you, how was your visit today?    Sandra Merida  Patient Information Supervisor   Patient Care Supervisor  [facility], and [facility], and [facility]        "

## 2020-07-03 NOTE — PROGRESS NOTES
"Subjective     Marychuy Gomes is a 49 year old female who presents to clinic today for the following health issues:    HPI     Patient think she have eczema or psoriasis of her scalp, itchy, ooze pus, getting worse, onset for 3 days. Pt had tired \"shampooing my head three times\"   Pt also mentioned that are red patchy  \"in my left arm, right ears , mouth\"     Rash  Onset: 07/01 is when it started weeping, has had the rash for years    Description:   Location: Back of head left arm, ears   Character: blotchy, flakey, painful, draining, red  Itching (Pruritis): YES    Progression of Symptoms:  worsening    Accompanying Signs & Symptoms:  Fever: no   Body aches or joint pain: no Headache  Sore throat symptoms: no   Recent cold symptoms: no     History:   Previous similar rash: YES- On other osmin of the body    Precipitating factors:   Exposure to similar rash: no   New exposures: None   Recent travel: no     Alleviating factors:  Nothing    Therapies Tried and outcome: Young living lavender and mint shampoo helped for a while     Pt thinks she has eczema or psoriasis.  Has had it for years.  The last 3 days, has been oozing.  Has been scratching it, so may have infected it herself with this.  It's now mildly painful.      Normally, just itches a lot, but not usually painful.      Also has some patches near her left elbow, right ear canal, corners of her mouth.      Has tried hydrocortisone cream on the elbow, but seems to worsen with the cream.      Her  [facility] which [facility] her mouth and ear symptoms.  Doesn't work on her elbow.        Current Outpatient Medications   Medication Sig Dispense Refill     Acetaminophen (TYLENOL PO) Take by mouth as needed for mild pain (for pain)       cephALEXin (KEFLEX) 500 MG capsule Take 1 capsule (500 mg) by mouth 3 times daily for 7 days 21 capsule 0     fluocinonide (LIDEX) 0.05 % external cream Apply topically 2 times daily 60 g 1     " "fluocinonide (LIDEX) 0.05 % external solution Apply topically 2 times daily as needed 60 mL 1     NO ACTIVE MEDICATIONS        Allergies   Allergen Reactions     Codeine Nausea and Vomiting     Recent Labs   Lab Test 03/26/18  0935 05/17/16  0825   * 111*   HDL 40* 39*   TRIG 211* 144   TSH  --  1.86      BP Readings from Last 3 Encounters:   07/03/20 138/78   06/18/18 110/70   04/27/18 120/80    Wt Readings from Last 3 Encounters:   07/03/20 72 kg (158 lb 12.8 oz)   06/18/18 72.9 kg (160 lb 12 oz)   04/27/18 71.7 kg (158 lb)                    Reviewed and updated as needed this visit by Provider         Review of Systems   Constitutional, HEENT, cardiovascular, pulmonary, gi and gu systems are negative, except as otherwise noted.      Objective    /78   Pulse 104   Temp 97.1  F (36.2  C) (Temporal)   Resp 16   Ht 1.66 m (5' 5.35\")   Wt 72 kg (158 lb 12.8 oz)   LMP 06/29/2020   BMI 26.14 kg/m    Body mass index is 26.14 kg/m .  Physical Exam   GENERAL: healthy, alert and no distress  SKIN: psoriatic rash on left elbow.  (slightly silvery, raised patch).  Erythema with oozing, excoriation on posterior scalp.  Tender.  Suspicious for cellulitis, likely on excoriated psoriatic rash.  Clear demarcation of erythema and slight scale present.  Very small scaly lesion in right ear.      Diagnostic Test Results:  Labs reviewed in Epic  No results found for any visits on 07/03/20.        Assessment & Plan       ICD-10-CM    1. Cellulitis of head or scalp  L03.811 cephALEXin (KEFLEX) 500 MG capsule   2. Psoriasis  L40.9 fluocinonide (LIDEX) 0.05 % external solution     fluocinonide (LIDEX) 0.05 % external cream     1.  I suspect that her excoriation allowed some bacteria to infiltrate the skin and cause infection.  We will treat a cellulitis with oral Keflex.  If not improving, she is to let me know.  2.  I suspect her underlying recurrent rashes have to do with psoriasis based on clinical appearance and " "location.  We will do trial of topical steroids to help control this.  If not responding, we can try something more aggressive or consider referral to dermatology.  Follow-up as needed.    Portions of this note were completed using Dragon dictation software.  Although reviewed, there may be typographical and other inadvertent errors that remain.        BMI:   Estimated body mass index is 26.14 kg/m  as calculated from the following:    Height as of this encounter: 1.66 m (5' 5.35\").    Weight as of this encounter: 72 kg (158 lb 12.8 oz).   Weight management plan: Discussed healthy diet and exercise guidelines        Patient Instructions   Thank you for visiting [facility]    Take the antibiotics until they're gone.    Once it's no longer painful, you can start using the topical solution on your scalp to help with itching/rash.    Can also use the cream on your elbow/ear.    I'd recommend trying hydrocortisone for your face lesions first.     If not responding, we can take some other treatment approaches.      As a reminder, psoriasis is managed, not cured.    Contact us or return if questions or concerns.     Have a nice day!    Dr. Stanley     No follow-ups on file.      If you had imaging scheduled please refer to your radiology prep sheet.      If you need medication refills, please contact your pharmacy 3 days before your prescriptions runs out or download the [facility] gurpreet for your smart phone.   If you are out of refills, your pharmacy will contact contact the clinic.    Contact us or return if questions or concerns.     [facility]:    MD Betzaida Maxwell PA-C Joel De Haan, PA-C Anna Niesen, PA-C Elizabeth \"Loli\" HARITHA Prado CNP APRN, EMILY Ritter APRN, CNP  Stu Gunter, RN, BSN       General information about your   [facility]      Clinic hours:     Lab hours:  Phone " [phone]  Monday 7:30 am-7 pm    Monday 8:30 am-6:30 pm  Tuesday-Friday 7:30 am-5 pm   Tuesday-Friday 8:30 am-4:30 pm    Pharmacy hours:  Phone [phone]  Monday 8:30 am-7pm  Tuesday-Friday 8:30am-6 pm                                     Mychart assistance [phone]    We would like to hear from you, how was your visit today?    Sandra Merida  Patient Information Supervisor   Patient Care Supervisor  [facility], and [facility], and [facility]            Return in about 3 months (around 10/3/2020) for Physical Exam.    Chris Stanley MD, MD  [facility]

## 2020-12-28 NOTE — PATIENT INSTRUCTIONS
Please call if you any questions.    [address]   [address]  [phone]        Yumiko Larosn     Wound Care: Aquaphor

## 2022-07-12 ENCOUNTER — APPOINTMENT (OUTPATIENT)
Dept: CT IMAGING | Facility: OTHER | Age: 52
End: 2022-07-12
Attending: FAMILY MEDICINE
Payer: COMMERCIAL

## 2022-07-12 ENCOUNTER — HOSPITAL ENCOUNTER (EMERGENCY)
Facility: OTHER | Age: 52
Discharge: HOME | End: 2022-07-12
Attending: FAMILY MEDICINE | Admitting: FAMILY MEDICINE
Payer: COMMERCIAL

## 2022-07-12 VITALS
SYSTOLIC BLOOD PRESSURE: 161 MMHG | DIASTOLIC BLOOD PRESSURE: 86 MMHG | HEART RATE: 112 BPM | OXYGEN SATURATION: 100 % | RESPIRATION RATE: 18 BRPM | TEMPERATURE: 99.2 F

## 2022-07-12 DIAGNOSIS — S06.0X9A CONCUSSION WITH LOSS OF CONSCIOUSNESS, INITIAL ENCOUNTER: ICD-10-CM
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
## 2022-07-12 LAB
ANION GAP SERPL CALCULATED.3IONS-SCNC: 11 MMOL/L (ref 3–14)
BASOPHILS # BLD AUTO: 0.1 10E3/UL (ref 0–0.2)
BASOPHILS NFR BLD AUTO: 1 %
BUN SERPL-MCNC: 10 MG/DL (ref 7–25)
CALCIUM SERPL-MCNC: 9.4 MG/DL (ref 8.6–10.3)
CHLORIDE BLD-SCNC: 104 MMOL/L (ref 98–107)
CO2 SERPL-SCNC: 24 MMOL/L (ref 21–31)
CREAT SERPL-MCNC: 0.85 MG/DL (ref 0.6–1.2)
EOSINOPHIL # BLD AUTO: 0.1 10E3/UL (ref 0–0.7)
EOSINOPHIL NFR BLD AUTO: 1 %
ERYTHROCYTE [DISTWIDTH] IN BLOOD BY AUTOMATED COUNT: 13.3 % (ref 10–15)
ETHANOL SERPL-MCNC: <0.01 G/DL
GFR SERPL CREATININE-BSD FRML MDRD: 82 ML/MIN/1.73M2
GLUCOSE BLD-MCNC: 116 MG/DL (ref 70–105)
HCT VFR BLD AUTO: 38.1 % (ref 35–47)
HGB BLD-MCNC: 12.7 G/DL (ref 11.7–15.7)
IMM GRANULOCYTES # BLD: 0.1 10E3/UL
IMM GRANULOCYTES NFR BLD: 1 %
INR PPP: 1 (ref 0.85–1.15)
LYMPHOCYTES # BLD AUTO: 1.8 10E3/UL (ref 0.8–5.3)
LYMPHOCYTES NFR BLD AUTO: 12 %
MCH RBC QN AUTO: 26.5 PG (ref 26.5–33)
MCHC RBC AUTO-ENTMCNC: 33.3 G/DL (ref 31.5–36.5)
MCV RBC AUTO: 80 FL (ref 78–100)
MONOCYTES # BLD AUTO: 0.8 10E3/UL (ref 0–1.3)
MONOCYTES NFR BLD AUTO: 6 %
NEUTROPHILS # BLD AUTO: 11.9 10E3/UL (ref 1.6–8.3)
NEUTROPHILS NFR BLD AUTO: 79 %
NRBC # BLD AUTO: 0 10E3/UL
NRBC BLD AUTO-RTO: 0 /100
PLATELET # BLD AUTO: 359 10E3/UL (ref 150–450)
POTASSIUM BLD-SCNC: 3.5 MMOL/L (ref 3.5–5.1)
RBC # BLD AUTO: 4.79 10E6/UL (ref 3.8–5.2)
SODIUM SERPL-SCNC: 139 MMOL/L (ref 134–144)
WBC # BLD AUTO: 14.8 10E3/UL (ref 4–11)

## 2022-07-12 PROCEDURE — 85610 PROTHROMBIN TIME: CPT | Performed by: FAMILY MEDICINE

## 2022-07-12 PROCEDURE — 72125 CT NECK SPINE W/O DYE: CPT

## 2022-07-12 PROCEDURE — 70450 CT HEAD/BRAIN W/O DYE: CPT

## 2022-07-12 PROCEDURE — 82310 ASSAY OF CALCIUM: CPT | Performed by: FAMILY MEDICINE

## 2022-07-12 PROCEDURE — 36415 COLL VENOUS BLD VENIPUNCTURE: CPT | Performed by: FAMILY MEDICINE

## 2022-07-12 PROCEDURE — 82077 ASSAY SPEC XCP UR&BREATH IA: CPT | Performed by: FAMILY MEDICINE

## 2022-07-12 PROCEDURE — 99284 EMERGENCY DEPT VISIT MOD MDM: CPT | Mod: 25 | Performed by: FAMILY MEDICINE

## 2022-07-12 PROCEDURE — 250N000013 HC RX MED GY IP 250 OP 250 PS 637: Performed by: FAMILY MEDICINE

## 2022-07-12 PROCEDURE — 99284 EMERGENCY DEPT VISIT MOD MDM: CPT | Performed by: FAMILY MEDICINE

## 2022-07-12 PROCEDURE — 85025 COMPLETE CBC W/AUTO DIFF WBC: CPT | Performed by: FAMILY MEDICINE

## 2022-07-12 RX ORDER — ACETAMINOPHEN 500 MG
1000 TABLET ORAL ONCE
Status: COMPLETED | OUTPATIENT
Start: 2022-07-12 | End: 2022-07-12

## 2022-07-12 RX ADMIN — ACETAMINOPHEN 1000 MG: 500 TABLET ORAL at 18:25

## 2022-07-12 ASSESSMENT — ENCOUNTER SYMPTOMS
NECK PAIN: 1
HEADACHES: 1
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
## 2022-07-28 ENCOUNTER — VIRTUAL VISIT (OUTPATIENT)
Dept: FAMILY MEDICINE | Facility: OTHER | Age: 52
End: 2022-07-28
Payer: COMMERCIAL

## 2022-07-28 DIAGNOSIS — S06.0X9A CONCUSSION WITH LOSS OF CONSCIOUSNESS, INITIAL ENCOUNTER: Primary | ICD-10-CM

## 2022-07-28 DIAGNOSIS — M26.609 TMJ (TEMPOROMANDIBULAR JOINT SYNDROME): ICD-10-CM

## 2022-07-28 DIAGNOSIS — H60.391 INFECTIVE OTITIS EXTERNA, RIGHT: ICD-10-CM

## 2022-07-28 DIAGNOSIS — H81.10 BENIGN PAROXYSMAL POSITIONAL VERTIGO, UNSPECIFIED LATERALITY: ICD-10-CM

## 2022-07-28 PROCEDURE — 99215 OFFICE O/P EST HI 40 MIN: CPT | Mod: 95 | Performed by: FAMILY MEDICINE
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
## 2022-07-28 NOTE — PATIENT INSTRUCTIONS
Thank you for visiting [facility]    Take more time off work.  I recommend not going back to work until you can do all your other normal daily activities without a headache or light sensitivity.    I think you also have some positional vertigo and PT should be able to help with this as well as your concussion symptoms.      For your TMJ, start back on ibuprofen at 600 mg 3 times/day for one week. You can reduce the dose or stop it if you're having stomach upset with it.    If that's not working, you can take the cyclobenzaprine to help with your TMJ.      If your jaw is still not improving, let me know, and we'll do a CT of your jaw to further evaluate.      Finish your antibiotics.    Contact us or return if questions or concerns.     Have a nice day!    Dr. Stanley     Return in about 2 weeks (around 8/11/2022) for Recheck.      If you need medication refills, please contact your pharmacy 3 days before your prescriptions runs out or download the [facility] gurpreet for your smart phone. If you are out of refills, your pharmacy will contact contact the clinic.                                     [facility] [phone]

## 2022-07-28 NOTE — LETTER
[facility]  [address]  [place] [address]  Phone: [phone]    July 28, 2022        Marychuy Gomes  [address] [place] [id number]          To whom it may concern:    RE: Marychuy Gomes    Patient was seen and treated today at our clinic and missed work.    She continues to suffer from concussion symptoms and should not return to work until she has more completely healed.      Please contact me for questions or concerns.      Sincerely,        Chris Stanley MD, MD

## 2022-08-09 ENCOUNTER — HOSPITAL ENCOUNTER (OUTPATIENT)
Dept: PHYSICAL THERAPY | Facility: CLINIC | Age: 52
Setting detail: THERAPIES SERIES
Discharge: HOME | End: 2022-08-09
Attending: FAMILY MEDICINE
Payer: COMMERCIAL

## 2022-08-09 DIAGNOSIS — M26.609 TMJ (TEMPOROMANDIBULAR JOINT SYNDROME): ICD-10-CM

## 2022-08-09 DIAGNOSIS — S06.0X9A CONCUSSION WITH LOSS OF CONSCIOUSNESS, INITIAL ENCOUNTER: ICD-10-CM

## 2022-08-09 DIAGNOSIS — H81.10 BENIGN PAROXYSMAL POSITIONAL VERTIGO, UNSPECIFIED LATERALITY: ICD-10-CM

## 2022-08-09 PROCEDURE — 97112 NEUROMUSCULAR REEDUCATION: CPT | Mod: GP | Performed by: PHYSICAL THERAPIST

## 2022-08-09 PROCEDURE — 97162 PT EVAL MOD COMPLEX 30 MIN: CPT | Mod: GP | Performed by: PHYSICAL THERAPIST

## 2022-08-10 ENCOUNTER — OFFICE VISIT (OUTPATIENT)
Dept: FAMILY MEDICINE | Facility: OTHER | Age: 52
End: 2022-08-10
Payer: COMMERCIAL

## 2022-08-10 ENCOUNTER — TELEPHONE (OUTPATIENT)
Dept: FAMILY MEDICINE | Facility: OTHER | Age: 52
End: 2022-08-10

## 2022-08-10 VITALS
TEMPERATURE: 98.7 F | DIASTOLIC BLOOD PRESSURE: 77 MMHG | BODY MASS INDEX: 25.99 KG/M2 | WEIGHT: 156 LBS | OXYGEN SATURATION: 98 % | SYSTOLIC BLOOD PRESSURE: 132 MMHG | HEART RATE: 95 BPM | HEIGHT: 65 IN

## 2022-08-10 DIAGNOSIS — S06.0X9A CONCUSSION WITH LOSS OF CONSCIOUSNESS, INITIAL ENCOUNTER: Primary | ICD-10-CM

## 2022-08-10 DIAGNOSIS — M26.609 TMJ (TEMPOROMANDIBULAR JOINT SYNDROME): ICD-10-CM

## 2022-08-10 DIAGNOSIS — H81.10 BENIGN PAROXYSMAL POSITIONAL VERTIGO, UNSPECIFIED LATERALITY: ICD-10-CM

## 2022-08-10 DIAGNOSIS — Z12.11 SCREEN FOR COLON CANCER: ICD-10-CM

## 2022-08-10 PROCEDURE — 99215 OFFICE O/P EST HI 40 MIN: CPT | Performed by: FAMILY MEDICINE

## 2022-08-10 ASSESSMENT — PAIN SCALES - GENERAL: PAINLEVEL: MILD PAIN (2)

## 2022-08-10 NOTE — TELEPHONE ENCOUNTER
Pt was seen earlier. Was told she was getting a prescription for amitriptyline sent to [facility] and they do not have anything. She is at pharmacy now.

## 2022-08-10 NOTE — TELEPHONE ENCOUNTER
Sorry, it looks like I forgot to click one button.  This should have gone through now.    Have a nice day!    Dr. Stanley

## 2022-08-10 NOTE — PATIENT INSTRUCTIONS
Thank you for visiting [facility]    Gradually increase your activity, but  stop any activity that causes a headache or similar symptoms.      Continue PT.    Try amitriptyline to help with sleep and headaches.  Start with 1/2 tablet.  Increase to full tablet at night if no side effects.      Follow up in around 2-3 weeks when you start back part-time at work.      Contact us or return if questions or concerns.     Have a nice day!    Dr. Stanley     Return in about 2 weeks (around 8/24/2022).      If you need medication refills, please contact your pharmacy 3 days before your prescriptions runs out or download the [facility] gurpreet for your smart phone. If you are out of refills, your pharmacy will contact contact the clinic.                                     MyChart Assistance [phone]
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
## 2022-08-11 ENCOUNTER — HOSPITAL ENCOUNTER (OUTPATIENT)
Dept: PHYSICAL THERAPY | Facility: CLINIC | Age: 52
Setting detail: THERAPIES SERIES
Discharge: HOME | End: 2022-08-11
Attending: FAMILY MEDICINE
Payer: COMMERCIAL

## 2022-08-11 PROCEDURE — 97112 NEUROMUSCULAR REEDUCATION: CPT | Mod: GP,59 | Performed by: PHYSICAL THERAPIST

## 2022-08-11 PROCEDURE — 97140 MANUAL THERAPY 1/> REGIONS: CPT | Mod: GP,59 | Performed by: PHYSICAL THERAPIST

## 2022-08-11 PROCEDURE — 95992 CANALITH REPOSITIONING PROC: CPT | Mod: GP | Performed by: PHYSICAL THERAPIST

## 2022-08-15 ENCOUNTER — NURSE TRIAGE (OUTPATIENT)
Dept: FAMILY MEDICINE | Facility: OTHER | Age: 52
End: 2022-08-15
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
## 2022-08-19 ENCOUNTER — MYC MEDICAL ADVICE (OUTPATIENT)
Dept: FAMILY MEDICINE | Facility: OTHER | Age: 52
End: 2022-08-19

## 2022-08-19 ENCOUNTER — OFFICE VISIT (OUTPATIENT)
Dept: OBGYN | Facility: OTHER | Age: 52
End: 2022-08-19
Payer: COMMERCIAL

## 2022-08-19 ENCOUNTER — TELEPHONE (OUTPATIENT)
Dept: GASTROENTEROLOGY | Facility: CLINIC | Age: 52
End: 2022-08-19

## 2022-08-19 VITALS
BODY MASS INDEX: 26.09 KG/M2 | OXYGEN SATURATION: 99 % | HEIGHT: 65 IN | HEART RATE: 86 BPM | WEIGHT: 156.6 LBS | SYSTOLIC BLOOD PRESSURE: 127 MMHG | DIASTOLIC BLOOD PRESSURE: 70 MMHG

## 2022-08-19 DIAGNOSIS — H81.10 BENIGN PAROXYSMAL POSITIONAL VERTIGO, UNSPECIFIED LATERALITY: ICD-10-CM

## 2022-08-19 DIAGNOSIS — S06.0X9A CONCUSSION WITH LOSS OF CONSCIOUSNESS, INITIAL ENCOUNTER: Primary | ICD-10-CM

## 2022-08-19 DIAGNOSIS — H33.319 RETINAL TEAR, UNSPECIFIED LATERALITY: ICD-10-CM

## 2022-08-19 DIAGNOSIS — D22.9 ATYPICAL MOLE: ICD-10-CM

## 2022-08-19 DIAGNOSIS — L40.9 PSORIASIS: ICD-10-CM

## 2022-08-19 DIAGNOSIS — M26.609 TMJ (TEMPOROMANDIBULAR JOINT SYNDROME): ICD-10-CM

## 2022-08-19 DIAGNOSIS — Z00.00 ANNUAL PHYSICAL EXAM: Primary | ICD-10-CM

## 2022-08-19 PROBLEM — N92.0 EXCESSIVE OR FREQUENT MENSTRUATION: Status: RESOLVED | Noted: 2018-04-27 | Resolved: 2022-08-19

## 2022-08-19 PROCEDURE — 87624 HPV HI-RISK TYP POOLED RSLT: CPT | Performed by: OBSTETRICS & GYNECOLOGY

## 2022-08-19 PROCEDURE — 99386 PREV VISIT NEW AGE 40-64: CPT | Performed by: OBSTETRICS & GYNECOLOGY

## 2022-08-19 PROCEDURE — G0145 SCR C/V CYTO,THINLAYER,RESCR: HCPCS | Performed by: OBSTETRICS & GYNECOLOGY

## 2022-08-19 RX ORDER — FLUOCINONIDE 0.5 MG/G
CREAM TOPICAL 2 TIMES DAILY
Qty: 60 G | Refills: 0 | Status: SHIPPED | OUTPATIENT
Start: 2022-08-19 | End: 2022-11-04

## 2022-08-19 RX ORDER — FLUOCINONIDE TOPICAL SOLUTION USP, 0.05% 0.5 MG/ML
SOLUTION TOPICAL 2 TIMES DAILY PRN
Qty: 60 ML | Refills: 0 | Status: SHIPPED | OUTPATIENT
Start: 2022-08-19 | End: 2022-11-04

## 2022-08-19 NOTE — PATIENT INSTRUCTIONS
PREVENTIVE HEALTH RECOMMENDATIONS:   Get a physical every year.  A pap test is important to have done starting at age 21 and then every three years as long as your pap is normal.  When you receive the results of your pap test it will include when you need to have your next pap test.    You should be tested each year for chlamydia and gonorrhea if you are aged 16-25 and if you have had a new sexual partner since you were last tested.   Vaccines: Get a flu shot each year.   Eat at least 8-10 servings of fruits and vegetables daily.  Eat whole-grain bread and cereal, whole-wheat pasta and brown rice instead of white grains and white rice.   For bone health: Eat calcium-rich foods (dairy products) or take calcium pills (500 to 600 mg with vitamin D) twice a day with food.   Exercise for an average of 30 minutes a day, 5 days of the week. It can be as simple as taking a brisk walk.  This will help you control your weight and prevent many diseases.   Limit alcohol to one drink per day.   Don't smoke and limit your exposure to second hand smoke.  If you smoke consider making a plan to quit. Go to [employer] and clink on   [employer]  for help   Wear sunscreen with at least SPF15 to prevent skin damage and skin cancer.   Brush your teeth twice a day and floss once a day and see your dentist twice a year for an exam and cleaning.   Have a great year and I will look forward to seeing you next year.   Yumiko Larson DO    If you have labs or imaging done, the results will automatically release in [facility] without an interpretation.  Your health care professional will review those results and send an interpretation with recommendations as soon as possible, but this may be 1-3 business days.    If you have any questions regarding your visit, please contact your care team.     [facility]: [phone]  Women s Health CLINIC HOURS TELEPHONE NUMBER       DO Monique Miner   JACEK Jefferson-GEOVANI Butts-GEOVANI Varela-GEOVANI Sylvester-  [place]-     Monday- [place]  8:00 a.m - 5:00 p.m    Tuesday- [place]  8:00 a.m - 5:00 p.m    Wednesday- OFF    Thursday- Surgery     Friday- [place]  8:00 a.m - 5:00 p.m. [facility]  [address]  [place] [address]  [phone] Fax  Imaging [phone]    [facility]  [address] [place] [address] [place] [address]  [phone]    [address] [place] [address]  [phone] Fax  Imaging [phone]     Urgent Care locations:  [facility] Monday-Friday  10 am - 8 pm  Saturday and Sunday   9 am - 5 pm  Monday-Friday   10 am - 8 pm  Saturday and Sunday   9 am - 5 pm   [phone]     If you need a medication refill, please contact your pharmacy. Please allow 3 business days for your refill to be completed.    As always, thank you for trusting us with your healthcare needs!    No

## 2022-08-19 NOTE — TELEPHONE ENCOUNTER
Screening Questions    BlueKIND OF PREP RedLOCATION [review exclusion criteria] GreenSEDATION TYPE      1. Are you active on mychart? Yes    2. What insurance is in the chart? [employer] [place]     3.   Ordering/Referring Provider: Neo    4. BMI   (If greater than 40 review exclusion criteria [PAC APPT IF [MAC] @ [facility])  25.93  [If yes, BMI OVER 40-EXTENDED PREP]      **(Sedation review/consideration needed)**  Do you have a legal guardian or Medical Power of    and/or are you able to give consent for your medical care?     Yes    5. Have you had a positive covid test in the last 90 days?   N - NA    6.  Are you currently on dialysis?   N [ If yes, G-PREP & HOSPITAL setting ONLY]     7.  Do you have chronic kidney disease?  N [ If yes, G-PREP ]    8.   Do you have a diagnosis of diabetes?   N   [ If yes, G-PREP ]    9.  On a regular basis do you go 3-5 days between bowel movements?   N   [ If yes, EXTENDED PREP]    10.  Are you taking any prescription pain medications on a routine schedule?    N - NA [ If yes, EXTENDED PREP] [If yes, MAC]      11.   Do you have any chemical dependencies such as alcohol, street drugs, or methadone?    N [If yes, MAC]    12.   Do you have any history of post-traumatic stress syndrome, severe anxiety or history of psychosis?    N  [If yes, MAC]    13.  [FEMALES] Are you currently pregnant? NA    If yes, how many weeks?       Respiratory/Heart Screening:  [If yes to any of the following HOSPITAL setting only]     14. Do you have Pulmonary Hypertension [Lungs]?   N       15. Do you have UNCONTROLLED asthma?   N     16.  Do you use daily home oxygen?  N      17. Do you have mod to severe Obstructive Sleep Apnea?         (OKAY @ [facility] [place] [facility]  [facility]  SH  PH  RI  MG - if pt is not on OXYGEN)  N      18.   Have you had a heart or lung transplant?   N      19.   Have you had a stroke or Transient ischemic attack (TIA - aka  mini stroke ) within 6 months?  (If yes, please review exclusion  criteria)  N     20.   In the past 6 months, have you had any heart related issues including cardiomyopathy or heart attack?   N           If yes, did it require cardiac stenting or other implantable device?   N      21.   Do you have any implantable devices in your body (pacemaker, defib, LVAD)? (If yes, please review exclusion criteria)  N   22.  Do you take the medication Phentermine?  NO        23. Do you take nitroglycerin?   N           If yes, how often? NA  (if yes, HOSPITAL setting ONLY)    24.  Are you currently taking any blood thinners?    [If yes, INFORM patient to follw  w/ ORDERING PROVIDER FOR BRIDGING INSTRUCTIONS]     N    25.   Do you transfer independently?                (If NO, please HOSPITAL setting ONLY)  Yes    26.   Preferred LOCAL Pharmacy for Pre Prescription:          [facility] - [address] [place] [address] [place] [facility] [place], [place] - [address] [address] [place] [address]      Scheduling Details  (Please ask for phone number if not scheduled by patient)      Caller : Marychuy  Date of Procedure: 11/4/22  Surgeon: Meme  Location: [facility]        Sedation Type: CS l   Conscious Sedation- Needs  for 6 hours after the procedure  MAC/General-Needs  for 24 hours after procedure    NA :[Pre-op Required] at [facility]  SH  MG and OR for MAC sedation   (advise patient they will need a pre-op WITH IN 30 DAYS of procedure date)     Type of Procedure Scheduled:   Lower Endoscopy [Colonoscopy]    Which Colonoscopy Prep was Sent?:   GoLytely due to magnesium citrate recall -     KHORUTS CF PATIENTS & GROEN'S PATIENTS NEEDS EXTENDED PREP       Informed patient they will need an adult  Yes  Cannot take any type of public or medical transportation alone    Pre-Procedure Covid test to be completed at [facility] or Externally: HOME  **INFORMED OF HOME TESTING & LAB OPTION**        Confirmed Nurse will call to complete assessment Yes    Additional comments:
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
## 2022-08-20 ENCOUNTER — HEALTH MAINTENANCE LETTER (OUTPATIENT)
Age: 52
End: 2022-08-20

## 2022-08-23 ENCOUNTER — HOSPITAL ENCOUNTER (OUTPATIENT)
Dept: PHYSICAL THERAPY | Facility: CLINIC | Age: 52
Setting detail: THERAPIES SERIES
Discharge: HOME | End: 2022-08-23
Attending: FAMILY MEDICINE
Payer: COMMERCIAL

## 2022-08-23 LAB
BKR LAB AP GYN ADEQUACY: NORMAL
BKR LAB AP GYN INTERPRETATION: NORMAL
BKR LAB AP HPV REFLEX: NORMAL
BKR LAB AP LMP: NORMAL
BKR LAB AP PREVIOUS ABNL DX: NORMAL
BKR LAB AP PREVIOUS ABNORMAL: NORMAL
PATH REPORT.COMMENTS IMP SPEC: NORMAL
PATH REPORT.COMMENTS IMP SPEC: NORMAL
PATH REPORT.RELEVANT HX SPEC: NORMAL

## 2022-08-23 PROCEDURE — 97112 NEUROMUSCULAR REEDUCATION: CPT | Mod: GP | Performed by: PHYSICAL THERAPIST

## 2022-08-23 NOTE — TELEPHONE ENCOUNTER
----- Message from Siria Wells, PT sent at 8/23/2022  1:55 PM CDT -----  Regarding: OT referral  Dr. Stanley     Can you please generate an OT order for cognitive therapy and adaptations for computer - double screen as well as work accommodations?    THank you     Siria Wells, PT  [phone]

## 2022-08-24 ENCOUNTER — OFFICE VISIT (OUTPATIENT)
Dept: DERMATOLOGY | Facility: CLINIC | Age: 52
End: 2022-08-24
Attending: OBSTETRICS & GYNECOLOGY
Payer: COMMERCIAL

## 2022-08-24 DIAGNOSIS — D18.01 CHERRY ANGIOMA: ICD-10-CM

## 2022-08-24 DIAGNOSIS — L40.9 PSORIASIS: ICD-10-CM

## 2022-08-24 DIAGNOSIS — L81.4 SOLAR LENTIGO: ICD-10-CM

## 2022-08-24 DIAGNOSIS — D22.9 MULTIPLE BENIGN NEVI: Primary | ICD-10-CM

## 2022-08-24 DIAGNOSIS — D49.2 NEOPLASM OF UNSPECIFIED BEHAVIOR OF BONE, SOFT TISSUE, AND SKIN: ICD-10-CM

## 2022-08-24 DIAGNOSIS — L82.1 SEBORRHEIC KERATOSES: ICD-10-CM

## 2022-08-24 PROCEDURE — 88305 TISSUE EXAM BY PATHOLOGIST: CPT | Performed by: PATHOLOGY

## 2022-08-24 PROCEDURE — 99204 OFFICE O/P NEW MOD 45 MIN: CPT | Mod: 25 | Performed by: PHYSICIAN ASSISTANT

## 2022-08-24 PROCEDURE — 11102 TANGNTL BX SKIN SINGLE LES: CPT | Performed by: PHYSICIAN ASSISTANT

## 2022-08-24 RX ORDER — BETAMETHASONE DIPROPIONATE 0.05 %
OINTMENT (GRAM) TOPICAL 2 TIMES DAILY
Qty: 45 G | Refills: 1 | Status: SHIPPED | OUTPATIENT
Start: 2022-08-24 | End: 2022-12-02

## 2022-08-24 ASSESSMENT — PAIN SCALES - GENERAL: PAINLEVEL: MILD PAIN (2)

## 2022-08-24 NOTE — LETTER
8/24/2022         RE: Marychuy Gomes  [address] [facility] [address]        Dear Colleague,    Thank you for referring your patient, Marychuy Gomes, to the [facility]. Please see a copy of my visit note below.    [facility] Dermatology Note  Encounter Date: Aug 24, 2022  Office Visit     Dermatology Problem List:  1. Psoriasis   - current tx: fluocinonide (scalp, ears), betamethasone 0.05% ointment (elbow)   2. NUB- left scapula back s/p bx 8/24/22    Family hx: Neg for skin cancer. Family history of psoriasis - great grandmother has it.   ____________________________________________    Assessment & Plan:    # Cherry angioma(s).    - No further intervention needed.    # Multiple clinically benign nevi.  - Signs and Symptoms of non-melanoma skin cancer and ABCDEs of melanoma reviewed with patient. Patient encouraged to perform monthly self skin exams and educated on how to perform them. UV precautions reviewed with patient. Patient was asked about new or changing moles/lesions on body.   - Sunscreen: Apply 20 minutes prior to going outdoors and reapply every two hours, when wet or sweating. We recommend using an SPF 30 or higher, and to use one that is water resistant.       - No further intervention needed.     # Seborrheic keratosis, non irritated.   - No further intervention needed.     # Solar lentigines.  - Sunscreen: Apply 20 minutes prior to going outdoors and reapply every two hours, when wet or sweating. We recommend using an SPF 30 or higher, and to use one that is water resistant.      - No further intervention needed.     # Psoriasis - family history of psoriasis in great grandmother. Denies joint pain.   - Continue fluocinonide prn as needed for flares, she uses only seldomly and it works well for her  - Start betamethasone 0.05% ointment prn as needed for flares on the elbows as they have been less responsive to the fluocinonide    #  Neoplasm of uncertain behavior on the left scapula back. The differential diagnosis includes MM vs DN vs other. .  - See procedure note.     Procedures Performed:   - Shave biopsy procedure note, location(s): left scapula back. After discussion of benefits and risks including but not limited to bleeding, infection, scar, incomplete removal, recurrence, and non-diagnostic biopsy, written consent and photographs were obtained. The area was cleaned with isopropyl alcohol. 0.5mL of 1% lidocaine with epinephrine was injected to obtain adequate anesthesia of lesion(s). Shave biopsy at site(s) performed. Hemostasis was achieved with aluminium chloride. Petrolatum ointment and a sterile dressing were applied. The patient tolerated the procedure and no complications were noted. The patient was provided with verbal and written post care instructions.     Follow-up: pending path results    Staff and Scribe:     Scribe Disclosure:   Jake SUBRAMANIAN, am serving as a scribe to document services personally performed by Sera Cagle PA-C, based on data collection and the provider's statements to me.  Provider Disclosure:   The documentation recorded by the scribe accurately reflects the services I personally performed and the decisions made by me.    All risks, benefits and alternatives were discussed with patient.  Patient is in agreement and understands the assessment and plan.  All questions were answered.    Sera Cagle PA-C, MPAS  [facility]: Phone: [phone], Fax: [phone]  [facility]: Phone: [phone],  Fax: [phone]  [facility]: Phone: [phone], Fax: [phone]    ____________________________________________    CC: Psoriasis (Fluocinonide cream and solution are current prescriptions) and Derm Problem (Many Nevi she would like checked. Would like a FBSC. No Hx of skin cancer. No family Hx  of skin cancer. Lump above left eyebrow present for 1 year.)    HPI:  Ms. Marychuy Gomes is a(n) 51 year old female who presents today as a new patient for a skin check.    Referred to derm for psoriasis and atypical mole. Notes on 8/19/22 was reviewed.     Today, patient is currently using fluocinonide for rash on the ears, side of mouth, forearms, and posterior scalp. States lesion on the left forearm started as a pimple in [place] and has been present for 2 years. Also notes pruritic lesions on the scalp. No joint pain.     Notes concern on the left eyebrow that has been present for 1 year.     Patient is otherwise feeling well, without additional concerns.    Labs:  NA    Physical Exam:  Vitals: LMP 07/15/2022   SKIN: Total skin excluding the undergarment areas was performed. The exam included the head/face, neck, both arms, chest, back, abdomen, both legs, digits and/or nails.   - Dias Type l-ll  - Left scapula back: 7 mm asymmetrical macle   - Moderate scaling on the occipital scalp, right lateral neck, posterior ear  - There are dome shaped bright red papules on the trunk and extremities.  - Multiple regular brown pigmented macules and papules are identified on the trunk and extremities.   - Scattered brown macules on sun exposed areas.  - There are waxy stuck on tan to brown papules on the trunk and extremities.  - No other lesions of concern on areas examined.           Medications:  Current Outpatient Medications   Medication     Acetaminophen (TYLENOL PO)     amitriptyline (ELAVIL) 25 MG tablet     fluocinonide (LIDEX) 0.05 % external cream     fluocinonide (LIDEX) 0.05 % external solution     NO ACTIVE MEDICATIONS     No current facility-administered medications for this visit.      Past Medical History:   Patient Active Problem List   Diagnosis     CARDIOVASCULAR SCREENING; LDL GOAL LESS THAN 160     Sensitive to smells     Epigastric pain     Cervical high risk HPV (human papillomavirus)  test positive     Vitamin D deficiency     Past Medical History:   Diagnosis Date     Cervical high risk HPV (human papillomavirus) test positive 5/17/16 5/17/16 NIL/+ HR HPV other.         CC Yumiko Larson,   [address] [place] [address] on close of this encounter.        Again, thank you for allowing me to participate in the care of your patient.        Sincerely,        Sera Cagle PA-C

## 2022-08-24 NOTE — PROGRESS NOTES
[facility] Dermatology Note  Encounter Date: Aug 24, 2022  Office Visit     Dermatology Problem List:  1. Psoriasis   - current tx: fluocinonide (scalp, ears), betamethasone 0.05% ointment (elbow)   2. NUB- left scapula back s/p bx 8/24/22    Family hx: Neg for skin cancer. Family history of psoriasis - great grandmother has it.   ____________________________________________    Assessment & Plan:    # Cherry angioma(s).    - No further intervention needed.    # Multiple clinically benign nevi.  - Signs and Symptoms of non-melanoma skin cancer and ABCDEs of melanoma reviewed with patient. Patient encouraged to perform monthly self skin exams and educated on how to perform them. UV precautions reviewed with patient. Patient was asked about new or changing moles/lesions on body.   - Sunscreen: Apply 20 minutes prior to going outdoors and reapply every two hours, when wet or sweating. We recommend using an SPF 30 or higher, and to use one that is water resistant.       - No further intervention needed.     # Seborrheic keratosis, non irritated.   - No further intervention needed.     # Solar lentigines.  - Sunscreen: Apply 20 minutes prior to going outdoors and reapply every two hours, when wet or sweating. We recommend using an SPF 30 or higher, and to use one that is water resistant.      - No further intervention needed.     # Psoriasis - family history of psoriasis in great grandmother. Denies joint pain.   - Continue fluocinonide prn as needed for flares, she uses only seldomly and it works well for her  - Start betamethasone 0.05% ointment prn as needed for flares on the elbows as they have been less responsive to the fluocinonide    # Neoplasm of uncertain behavior on the left scapula back. The differential diagnosis includes MM vs DN vs other. .  - See procedure note.     Procedures Performed:   - Shave biopsy procedure note, location(s): left scapula back. After discussion of benefits and  risks including but not limited to bleeding, infection, scar, incomplete removal, recurrence, and non-diagnostic biopsy, written consent and photographs were obtained. The area was cleaned with isopropyl alcohol. 0.5mL of 1% lidocaine with epinephrine was injected to obtain adequate anesthesia of lesion(s). Shave biopsy at site(s) performed. Hemostasis was achieved with aluminium chloride. Petrolatum ointment and a sterile dressing were applied. The patient tolerated the procedure and no complications were noted. The patient was provided with verbal and written post care instructions.     Follow-up: pending path results    Staff and Scribe:     Scribe Disclosure:   Jake SUBRAMANIAN, am serving as a scribe to document services personally performed by Sera Cagle PA-C, based on data collection and the provider's statements to me.  Provider Disclosure:   The documentation recorded by the scribe accurately reflects the services I personally performed and the decisions made by me.    All risks, benefits and alternatives were discussed with patient.  Patient is in agreement and understands the assessment and plan.  All questions were answered.    Sera Cagle PA-C, RUSTS  [facility]: Phone: [phone], Fax: [phone]  [facility]: Phone: [phone],  Fax: [phone]  [facility]: Phone: [phone], Fax: [phone]    ____________________________________________    CC: Psoriasis (Fluocinonide cream and solution are current prescriptions) and Derm Problem (Many Nevi she would like checked. Would like a FBSC. No Hx of skin cancer. No family Hx of skin cancer. Lump above left eyebrow present for 1 year.)    HPI:  Ms. Marychuy Gomes is a(n) 51 year old female who presents today as a new patient for a skin check.    Referred to derm for psoriasis and atypical mole. Notes on 8/19/22 was reviewed.      Today, patient is currently using fluocinonide for rash on the ears, side of mouth, forearms, and posterior scalp. States lesion on the left forearm started as a pimple in [place] and has been present for 2 years. Also notes pruritic lesions on the scalp. No joint pain.     Notes concern on the left eyebrow that has been present for 1 year.     Patient is otherwise feeling well, without additional concerns.    Labs:  NA    Physical Exam:  Vitals: LMP 07/15/2022   SKIN: Total skin excluding the undergarment areas was performed. The exam included the head/face, neck, both arms, chest, back, abdomen, both legs, digits and/or nails.   - Dias Type l-ll  - Left scapula back: 7 mm asymmetrical macle   - Moderate scaling on the occipital scalp, right lateral neck, posterior ear  - There are dome shaped bright red papules on the trunk and extremities.  - Multiple regular brown pigmented macules and papules are identified on the trunk and extremities.   - Scattered brown macules on sun exposed areas.  - There are waxy stuck on tan to brown papules on the trunk and extremities.  - No other lesions of concern on areas examined.           Medications:  Current Outpatient Medications   Medication     Acetaminophen (TYLENOL PO)     amitriptyline (ELAVIL) 25 MG tablet     fluocinonide (LIDEX) 0.05 % external cream     fluocinonide (LIDEX) 0.05 % external solution     NO ACTIVE MEDICATIONS     No current facility-administered medications for this visit.      Past Medical History:   Patient Active Problem List   Diagnosis     CARDIOVASCULAR SCREENING; LDL GOAL LESS THAN 160     Sensitive to smells     Epigastric pain     Cervical high risk HPV (human papillomavirus) test positive     Vitamin D deficiency     Past Medical History:   Diagnosis Date     Cervical high risk HPV (human papillomavirus) test positive 5/17/16 5/17/16 NIL/+ HR HPV other.         CC Yumiko Larson, DO  [address] [facility] [facility] [address]  [place]  [address] on close of this encounter.

## 2022-08-24 NOTE — PATIENT INSTRUCTIONS

## 2022-08-25 LAB
HUMAN PAPILLOMA VIRUS 16 DNA: NEGATIVE
HUMAN PAPILLOMA VIRUS 18 DNA: NEGATIVE
HUMAN PAPILLOMA VIRUS FINAL DIAGNOSIS: NORMAL
HUMAN PAPILLOMA VIRUS OTHER HR: NEGATIVE

## 2022-08-26 LAB
PATH REPORT.COMMENTS IMP SPEC: NORMAL
PATH REPORT.COMMENTS IMP SPEC: NORMAL
PATH REPORT.FINAL DX SPEC: NORMAL
PATH REPORT.GROSS SPEC: NORMAL
PATH REPORT.MICROSCOPIC SPEC OTHER STN: NORMAL
PATH REPORT.RELEVANT HX SPEC: NORMAL

## 2022-08-29 ENCOUNTER — OFFICE VISIT (OUTPATIENT)
Dept: FAMILY MEDICINE | Facility: OTHER | Age: 52
End: 2022-08-29
Payer: COMMERCIAL

## 2022-08-29 VITALS
HEART RATE: 94 BPM | TEMPERATURE: 97.4 F | DIASTOLIC BLOOD PRESSURE: 80 MMHG | BODY MASS INDEX: 26.16 KG/M2 | OXYGEN SATURATION: 99 % | SYSTOLIC BLOOD PRESSURE: 130 MMHG | WEIGHT: 158 LBS

## 2022-08-29 DIAGNOSIS — S06.0X9A CONCUSSION WITH LOSS OF CONSCIOUSNESS, INITIAL ENCOUNTER: Primary | ICD-10-CM

## 2022-08-29 DIAGNOSIS — Z11.59 ENCOUNTER FOR HEPATITIS C SCREENING TEST FOR LOW RISK PATIENT: ICD-10-CM

## 2022-08-29 DIAGNOSIS — H33.312 RETINAL TEAR OF LEFT EYE: ICD-10-CM

## 2022-08-29 DIAGNOSIS — H81.10 BENIGN PAROXYSMAL POSITIONAL VERTIGO, UNSPECIFIED LATERALITY: ICD-10-CM

## 2022-08-29 DIAGNOSIS — H43.813 PVD (POSTERIOR VITREOUS DETACHMENT), BILATERAL: ICD-10-CM

## 2022-08-29 DIAGNOSIS — Z11.4 SCREENING FOR HIV (HUMAN IMMUNODEFICIENCY VIRUS): ICD-10-CM

## 2022-08-29 PROCEDURE — 99215 OFFICE O/P EST HI 40 MIN: CPT | Performed by: FAMILY MEDICINE

## 2022-08-29 ASSESSMENT — PAIN SCALES - GENERAL: PAINLEVEL: MILD PAIN (3)

## 2022-08-29 NOTE — PATIENT INSTRUCTIONS
Thank you for visiting [facility]    If any particular activity causes headaches, dizziness or other concussion symptoms lasting more than 5 minutes, then stop that activity and don't attempt again for 24 hours.      Do PT, Occupational Therapy.  Can also see neurology if desired.      Contact us or return if questions or concerns.     Have a nice day!    Dr. Stanley     Return in about 3 weeks (around 9/19/2022).      If you need medication refills, please contact your pharmacy 3 days before your prescriptions runs out or download the [facility] gurpreet for your smart phone. If you are out of refills, your pharmacy will contact contact the clinic.                                     [facility] Assistance [phone]

## 2022-08-29 NOTE — PROGRESS NOTES
Assessment & Plan       ICD-10-CM    1. Concussion with loss of consciousness, initial encounter  S06.0X9A Concussion  Referral     Concussion  Referral     Concussion  Referral   2. Retinal tear of left eye  H33.312 Concussion  Referral     Concussion  Referral   3. Benign paroxysmal positional vertigo, unspecified laterality  H81.10 Concussion  Referral     Concussion  Referral     Concussion  Referral   4. PVD (posterior vitreous detachment), bilateral  H43.813 Concussion  Referral     Concussion  Referral   5. Screening for HIV (human immunodeficiency virus)  Z11.4 HIV Antigen Antibody Combo   6. Encounter for hepatitis C screening test for low risk patient  Z11.59 Hepatitis C Screen Reflex to HCV RNA Quant and Genotype      1.  Still having slow recovery.  This may have been slowed by her retinal tear and interventions for that.  Unfortunately, she has not started occupational therapy yet.  Will refer to concussion  to try to get further therapies and even neurologic evaluation performed.  I am concerned that she is still having difficulty with her focus and memory.  Headache is overall improving.  Since she never started the TCA, we will stop that.  She is sleeping well.  Briefly discussed trial of Topamax to help with headaches, but discussed this may worsen her memory.  She wished to not pursue that.  We will see if neurology has other recommendations.  Recommended further time away from work as she is not equal to her work tasks at this time.  Filled out [facility] paperwork related to today.  2, for.  Primarily managed by ophthalmology.  We will follow and assist as able.  It does sound like this is improved.  3.  Dramatic improvement in her vertigo.  Continue to follow with physical therapy.  Most of her residual symptoms seem to be related to her concussion.  5, 6.  Screening ordered.    Portions of this note  were completed using Dragon dictation software.  Although reviewed, there may be typographical and other inadvertent errors that remain.         Ordering of each unique test  Prescription drug management  40 minutes spent on the date of the encounter doing chart review, history and exam, documentation and further activities per the note       Patient Instructions   Thank you for visiting [facility]    If any particular activity causes headaches, dizziness or other concussion symptoms lasting more than 5 minutes, then stop that activity and don't attempt again for 24 hours.      Do PT, Occupational Therapy.  Can also see neurology if desired.      Contact us or return if questions or concerns.     Have a nice day!    Dr. Stanley     Return in about 3 weeks (around 9/19/2022).      If you need medication refills, please contact your pharmacy 3 days before your prescriptions runs out or download the [facility] gurpreet for your smart phone. If you are out of refills, your pharmacy will contact contact the clinic.                                     [employer] [employer] Assistance [phone]                    Return in about 3 weeks (around 9/19/2022) for Recheck.    Chris Stanley MD, MD  [facility] JEREMY Perrin is a 51 year old, presenting for the following health issues:  Concussion      History of Present Illness       Reason for visit:  Concussion    She eats 2-3 servings of fruits and vegetables daily.She consumes 1 sweetened beverage(s) daily.She exercises with enough effort to increase her heart rate 20 to 29 minutes per day.  She exercises with enough effort to increase her heart rate 4 days per week. She is missing 7 dose(s) of medications per week.  She is not taking prescribed medications regularly due to remembering to take.     Pt had what she thought was an aura.  It just stayed for a while, and she went in to see the eye doctor.  Found to have a retinal  "tear and then had it treated with a laser.      On Sunday, awoke with her right eye being irritated/pink.  This has improved.  Now, has a blurry spot just outside the center of her vision on the right.  Will see the eye doctor tomorrow at 10:40.    She feels like there's something wrong with her neck.  Last week, pt got on all 4s and put her head down.  Had immediate \"dizziness\" (not vertigo), and sudden onset of HA.      Struggling with brushing her teeth.      Vertigo is nearly gone.  PT has been working with her on exercises for this.  Did have some on Sunday while in the car for a long time.  Had some associated nausea and HA.    Still struggles with bending forward.  Gets HA with this.  Sleeping really well.  Ibuprofen does help with her HA.  Taking this about 5 times/week.      She never took the amitriptyline.  Just forgot about this.      She hasn't started back to work you.      The most active thing she's done was walk down the road and back.      She followed up getting her annual physical, dermatology f/u, mammogram are done.      This is the most she's been able to deal with.      Occupational Therapy hasn't started yet.            Review of Systems   Constitutional, HEENT, cardiovascular, pulmonary, gi and gu systems are negative, except as otherwise noted.      Objective    /80 (BP Location: Left arm, Patient Position: Sitting, Cuff Size: Adult Regular)   Pulse 94   Temp 97.4  F (36.3  C) (Temporal)   Wt 71.7 kg (158 lb)   LMP 08/20/2022   SpO2 99%   BMI 26.16 kg/m    Body mass index is 26.16 kg/m .  Physical Exam   GENERAL: healthy, alert and no distress  NECK: no adenopathy, no asymmetry, masses, or scars and thyroid normal to palpation  RESP: lungs clear to auscultation - no rales, rhonchi or wheezes  CV: regular rate and rhythm, normal S1 S2, no S3 or S4, no murmur, click or rub, no peripheral edema and peripheral pulses strong  ABDOMEN: soft, nontender, no hepatosplenomegaly, no " masses and bowel sounds normal  MS: no gross musculoskeletal defects noted, no edema  NEURO: Normal strength and tone, sensory exam grossly normal and memory seems slowed, some difficulty with focus.    Office Visit on 08/24/2022   Component Date Value Ref Range Status     Case Report 08/24/2022    Final                    Value:Surgical Pathology Report                         Case: [id number]                                  Authorizing Provider:  Sera Cagle PA-C     Collected:           08/24/2022 10:27 AM          Ordering Location:     [facility]   Received:            08/24/2022 03:46 PM                                 [place]                                                                  Pathologist:           aMnuel Brizuela MD                                                      Specimen:    Skin, left scapula back                                                                     Final Diagnosis 08/24/2022    Final                    Value:This result contains rich text formatting which cannot be displayed here.     Clinical Information 08/24/2022    Final                    Value:This result contains rich text formatting which cannot be displayed here.     Gross Description 08/24/2022    Final                    Value:This result contains rich text formatting which cannot be displayed here.     Microscopic Description 08/24/2022    Final                    Value:This result contains rich text formatting which cannot be displayed here.     Performing Labs 08/24/2022    Final                    Value:This result contains rich text formatting which cannot be displayed here.     No results found for any visits on 08/29/22.  No results found for this or any previous visit (from the past 24 hour(s)).                .  ..

## 2022-09-01 ENCOUNTER — HOSPITAL ENCOUNTER (OUTPATIENT)
Dept: PHYSICAL THERAPY | Facility: CLINIC | Age: 52
Setting detail: THERAPIES SERIES
Discharge: HOME | End: 2022-09-01
Attending: FAMILY MEDICINE
Payer: COMMERCIAL

## 2022-09-01 PROCEDURE — 97110 THERAPEUTIC EXERCISES: CPT | Mod: GP | Performed by: PHYSICAL THERAPIST

## 2022-09-12 ENCOUNTER — OFFICE VISIT (OUTPATIENT)
Dept: FAMILY MEDICINE | Facility: OTHER | Age: 52
End: 2022-09-12

## 2022-09-12 ENCOUNTER — HOSPITAL ENCOUNTER (OUTPATIENT)
Dept: SPEECH THERAPY | Facility: CLINIC | Age: 52
Setting detail: THERAPIES SERIES
Discharge: HOME | End: 2022-09-12
Attending: FAMILY MEDICINE
Payer: COMMERCIAL

## 2022-09-12 ENCOUNTER — LAB (OUTPATIENT)
Dept: LAB | Facility: OTHER | Age: 52
End: 2022-09-12
Payer: COMMERCIAL

## 2022-09-12 VITALS
WEIGHT: 158 LBS | TEMPERATURE: 97.5 F | BODY MASS INDEX: 26.16 KG/M2 | SYSTOLIC BLOOD PRESSURE: 120 MMHG | HEART RATE: 77 BPM | DIASTOLIC BLOOD PRESSURE: 76 MMHG | OXYGEN SATURATION: 99 %

## 2022-09-12 DIAGNOSIS — H81.10 BENIGN PAROXYSMAL POSITIONAL VERTIGO, UNSPECIFIED LATERALITY: ICD-10-CM

## 2022-09-12 DIAGNOSIS — S06.0X9A CONCUSSION WITH LOSS OF CONSCIOUSNESS, INITIAL ENCOUNTER: Primary | ICD-10-CM

## 2022-09-12 DIAGNOSIS — H43.813 PVD (POSTERIOR VITREOUS DETACHMENT), BILATERAL: ICD-10-CM

## 2022-09-12 DIAGNOSIS — Z12.11 SCREEN FOR COLON CANCER: ICD-10-CM

## 2022-09-12 DIAGNOSIS — H33.312 RETINAL TEAR OF LEFT EYE: ICD-10-CM

## 2022-09-12 DIAGNOSIS — Z00.00 ANNUAL PHYSICAL EXAM: ICD-10-CM

## 2022-09-12 DIAGNOSIS — Z11.59 ENCOUNTER FOR HEPATITIS C SCREENING TEST FOR LOW RISK PATIENT: ICD-10-CM

## 2022-09-12 DIAGNOSIS — S06.0X9A CONCUSSION WITH LOSS OF CONSCIOUSNESS, INITIAL ENCOUNTER: ICD-10-CM

## 2022-09-12 DIAGNOSIS — Z11.4 SCREENING FOR HIV (HUMAN IMMUNODEFICIENCY VIRUS): ICD-10-CM

## 2022-09-12 DIAGNOSIS — F41.9 ANXIETY: ICD-10-CM

## 2022-09-12 DIAGNOSIS — R41.3 MEMORY LOSS: ICD-10-CM

## 2022-09-12 LAB
ALBUMIN SERPL-MCNC: 3.9 G/DL (ref 3.4–5)
ALP SERPL-CCNC: 106 U/L (ref 40–150)
ALT SERPL W P-5'-P-CCNC: 23 U/L (ref 0–50)
ANION GAP SERPL CALCULATED.3IONS-SCNC: 7 MMOL/L (ref 3–14)
AST SERPL W P-5'-P-CCNC: 15 U/L (ref 0–45)
BILIRUB SERPL-MCNC: 0.4 MG/DL (ref 0.2–1.3)
BUN SERPL-MCNC: 9 MG/DL (ref 7–30)
CALCIUM SERPL-MCNC: 8.8 MG/DL (ref 8.5–10.1)
CHLORIDE BLD-SCNC: 107 MMOL/L (ref 94–109)
CHOLEST SERPL-MCNC: 214 MG/DL
CO2 SERPL-SCNC: 25 MMOL/L (ref 20–32)
CREAT SERPL-MCNC: 0.75 MG/DL (ref 0.52–1.04)
ERYTHROCYTE [DISTWIDTH] IN BLOOD BY AUTOMATED COUNT: 13.9 % (ref 10–15)
FASTING STATUS PATIENT QL REPORTED: YES
GFR SERPL CREATININE-BSD FRML MDRD: >90 ML/MIN/1.73M2
GLUCOSE BLD-MCNC: 90 MG/DL (ref 70–99)
HCT VFR BLD AUTO: 38 % (ref 35–47)
HCV AB SERPL QL IA: NONREACTIVE
HDLC SERPL-MCNC: 41 MG/DL
HGB BLD-MCNC: 12.4 G/DL (ref 11.7–15.7)
HIV 1+2 AB+HIV1 P24 AG SERPL QL IA: NONREACTIVE
LDLC SERPL CALC-MCNC: 132 MG/DL
MCH RBC QN AUTO: 26.1 PG (ref 26.5–33)
MCHC RBC AUTO-ENTMCNC: 32.6 G/DL (ref 31.5–36.5)
MCV RBC AUTO: 80 FL (ref 78–100)
NONHDLC SERPL-MCNC: 173 MG/DL
PLATELET # BLD AUTO: 326 10E3/UL (ref 150–450)
POTASSIUM BLD-SCNC: 3.6 MMOL/L (ref 3.4–5.3)
PROT SERPL-MCNC: 7.5 G/DL (ref 6.8–8.8)
RBC # BLD AUTO: 4.76 10E6/UL (ref 3.8–5.2)
SODIUM SERPL-SCNC: 139 MMOL/L (ref 133–144)
TRIGL SERPL-MCNC: 205 MG/DL
TSH SERPL DL<=0.005 MIU/L-ACNC: 1.95 MU/L (ref 0.4–4)
WBC # BLD AUTO: 9.4 10E3/UL (ref 4–11)

## 2022-09-12 PROCEDURE — 84443 ASSAY THYROID STIM HORMONE: CPT

## 2022-09-12 PROCEDURE — 96125 COGNITIVE TEST BY HC PRO: CPT | Mod: GN | Performed by: SPEECH-LANGUAGE PATHOLOGIST

## 2022-09-12 PROCEDURE — 85027 COMPLETE CBC AUTOMATED: CPT

## 2022-09-12 PROCEDURE — 80061 LIPID PANEL: CPT

## 2022-09-12 PROCEDURE — 80053 COMPREHEN METABOLIC PANEL: CPT

## 2022-09-12 PROCEDURE — 86803 HEPATITIS C AB TEST: CPT

## 2022-09-12 PROCEDURE — 87389 HIV-1 AG W/HIV-1&-2 AB AG IA: CPT

## 2022-09-12 PROCEDURE — 36415 COLL VENOUS BLD VENIPUNCTURE: CPT

## 2022-09-12 PROCEDURE — 99214 OFFICE O/P EST MOD 30 MIN: CPT | Performed by: FAMILY MEDICINE

## 2022-09-12 RX ORDER — DULOXETIN HYDROCHLORIDE 20 MG/1
20 CAPSULE, DELAYED RELEASE ORAL 2 TIMES DAILY
Qty: 60 CAPSULE | Refills: 1 | Status: SHIPPED | OUTPATIENT
Start: 2022-09-12 | End: 2022-12-02

## 2022-09-12 ASSESSMENT — PAIN SCALES - GENERAL: PAINLEVEL: MILD PAIN (2)

## 2022-09-12 NOTE — LETTER
[facility]  [address]  [facility] [address]  Phone: [phone]    September 12, 2022        Marychuy Gomes  [address] [place] [address]          To whom it may concern:    RE: Marychuy Gomes    Patient was seen and treated today at our clinic and missed work.    She will need to extend her short-term disability from her concussion until at least October 6th, pending evaluation by other specialists to ensure she has healed enough to return to work.      Please contact me for questions or concerns.      Sincerely,        Chris Stanley MD, MD
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
## 2022-09-12 NOTE — PATIENT INSTRUCTIONS
Thank you for visiting [facility]    Continue to avoid activities that cause a headache.  Don't try them again until you've been headache-free for at least 24 hours.      Continue with PT, ST, Occupational Therapy.  See neurology if not fully improved by the time of your appointment.      Try starting low-dose Cymbalta to help with your residual symptoms.      Contact us or return if questions or concerns.     Have a nice day!    Dr. Stanley     Return in about 4 weeks (around 10/10/2022) for Recheck.      If you need medication refills, please contact your pharmacy 3 days before your prescriptions runs out or download the [facility] gurpreet for your smart phone. If you are out of refills, your pharmacy will contact contact the clinic.                                     MyChart Assistance [phone]

## 2022-09-13 ENCOUNTER — HOSPITAL ENCOUNTER (OUTPATIENT)
Dept: PHYSICAL THERAPY | Facility: CLINIC | Age: 52
Setting detail: THERAPIES SERIES
Discharge: HOME | End: 2022-09-13
Attending: FAMILY MEDICINE
Payer: COMMERCIAL

## 2022-09-13 PROCEDURE — 97110 THERAPEUTIC EXERCISES: CPT | Mod: GP | Performed by: PHYSICAL THERAPIST

## 2022-09-13 NOTE — RESULT ENCOUNTER NOTE
Marychuy,    All of your labs were normal for you except your cholesterol.  Work on staying active and minimizing saturated fat intake to help with this over time.    Have a nice day!    Dr. Stanley
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
## 2022-09-13 NOTE — PROGRESS NOTES
Repeatable Battery for the Assessment of Neuropsychological Status Update   (RBANS  Update)  The Repeatable Battery for the Assessment of Neuropsychological Status Update (RBANS  Update) was administered to Marychuy Gomes on 9/13/2022.  The RBANS Update was originally developed with a primary focus on assessment of dementia, and measures cognitive decline or improvement across these domains: immediate memory, visuospatial/constructional; language; attention; and delayed memory.  However, special group studies are available for Alzheimer's Disease, Vascular Dementia, HIV Dementia, Winsted's Disease, Parkinson's Disease, Depression, Schizophrenia, and Closed Head Injury.   The RBANS can be used by clinicians and neuropsychologists to help screen for deficits in acute-care settings; track recovery during rehabilitation; track progression of neurological disorders; and screen for neurocognitive status in adolescents.  This test is normed for ages 12-89.  The subtest normative data are presented in scaled score units that have a mean of 10 and a standard deviation of 3.          Total Score Scaled Score  Percentile Group       I.  Immediate memory    1.  List Learning   22  6   2.  Story Memory   17  11  Immediate Memory Index Score  = 90    II.  Visuospatial/Constructional    3.  Figure copy   20  13  4.  Line Orientation   15     26-50  Visuospatial/Constructional Index Score  = 102    III.  Language     5.  Picture Naming   10     51-75  6.  Semantic Fluency   18  8  Language Index Score = 94    IV.  Attention  7.  Digit Span     15  15  8.  Coding     43  9  Attention Index Score = 112    V.  Delayed Memory  9.  List recall    5     26-50  10. List Recognition   20     51-75  11. Story Recall   8  8  12. Figure Recall   17  13  Delayed Memory Index Score = 101  Sum of Total Scores for Subtest 9+11+12 30    Sum of Index Scores = 489  Total Scale Score = 96  Percentile Rank= 39th    INTERPRETATION OF TEST  RESULTS: Results of the RBANS reveal patient received a total scale score of 96, placing her in the 39th percentile rank. It is suspected that prior to patient's concussion, she would have performed well above average as she reports difficulty/impaired function in all the above described areas. Skilled cognitive linguistic therapy is medically warranted at this time in order to improve patient to baseline function.    TIME ADMINISTERING TEST: 30  TIME FOR INTERPRETATION AND PREPARATION OF REPORT: 25  TOTAL TIME: 55    Repeatable Battery for the Assessment of Neuropsychological Status Update (RBANS Update). Copyright   2012 [facility] Adapted and reproduced with permission. All rights reserved.

## 2022-09-19 ENCOUNTER — HOSPITAL ENCOUNTER (OUTPATIENT)
Dept: SPEECH THERAPY | Facility: CLINIC | Age: 52
Setting detail: THERAPIES SERIES
Discharge: HOME | End: 2022-09-19
Attending: FAMILY MEDICINE
Payer: COMMERCIAL

## 2022-09-19 ENCOUNTER — HOSPITAL ENCOUNTER (OUTPATIENT)
Dept: PHYSICAL THERAPY | Facility: CLINIC | Age: 52
Setting detail: THERAPIES SERIES
Discharge: HOME | End: 2022-09-19
Attending: FAMILY MEDICINE
Payer: COMMERCIAL

## 2022-09-19 PROCEDURE — 97750 PHYSICAL PERFORMANCE TEST: CPT | Mod: GP | Performed by: PHYSICAL THERAPIST

## 2022-09-19 PROCEDURE — 97129 THER IVNTJ 1ST 15 MIN: CPT | Mod: GN | Performed by: SPEECH-LANGUAGE PATHOLOGIST

## 2022-09-19 PROCEDURE — 97112 NEUROMUSCULAR REEDUCATION: CPT | Mod: GP | Performed by: PHYSICAL THERAPIST

## 2022-09-19 PROCEDURE — 97130 THER IVNTJ EA ADDL 15 MIN: CPT | Mod: GN | Performed by: SPEECH-LANGUAGE PATHOLOGIST

## 2022-09-19 NOTE — PROGRESS NOTES
"Vestibular/Ocular Motor Test:     Not Tested Headache Dizziness Nausea Fogginess Comments   Baseline N/A 1/10 0/10 0/10 2/10    Smooth Pursuits  1/10 0/10 0/10 0/10    Saccades-Horizontal  2/10 0/10 0/10 0/10 \"feels like the headache is from the neck\"   Saccades-Vertical  2/10 0/10 0/10 0/10    Convergence (Near Point)  2/10 x 3 trials 0/10 x 3 trial 0/10 x 3 trials 0/10 x 3 trials (Near Point in CM)  Measure 1: 4.7 cm  Measure 2: 8 cm  Measure 3 8.5 cm   VOR Horizontal  2.5/10 2/10 0/10 2/10 Dizziness increased and completed only 4 reps before dizziness too much. Felt as if she understood the instructions but could not follow through.  Eye issues noted   VOR Vertical  2.5/10 0/10 0/10 0/10 Completed 10 reps but slower than 180 bpm, eye issues noted   Visual Motion Sensitivity Test  2.5/10 0/10 0/10 0/10        "

## 2022-09-27 ENCOUNTER — HOSPITAL ENCOUNTER (OUTPATIENT)
Dept: SPEECH THERAPY | Facility: CLINIC | Age: 52
Setting detail: THERAPIES SERIES
Discharge: HOME | End: 2022-09-27
Attending: FAMILY MEDICINE
Payer: COMMERCIAL

## 2022-09-27 ENCOUNTER — HOSPITAL ENCOUNTER (OUTPATIENT)
Dept: OCCUPATIONAL THERAPY | Facility: CLINIC | Age: 52
Setting detail: THERAPIES SERIES
Discharge: HOME | End: 2022-09-27
Attending: FAMILY MEDICINE
Payer: COMMERCIAL

## 2022-09-27 ENCOUNTER — HOSPITAL ENCOUNTER (OUTPATIENT)
Dept: PHYSICAL THERAPY | Facility: CLINIC | Age: 52
Setting detail: THERAPIES SERIES
Discharge: HOME | End: 2022-09-27
Attending: FAMILY MEDICINE
Payer: COMMERCIAL

## 2022-09-27 PROCEDURE — 97165 OT EVAL LOW COMPLEX 30 MIN: CPT | Mod: GO | Performed by: OCCUPATIONAL THERAPIST

## 2022-09-27 PROCEDURE — 97130 THER IVNTJ EA ADDL 15 MIN: CPT | Mod: GN | Performed by: SPEECH-LANGUAGE PATHOLOGIST

## 2022-09-27 PROCEDURE — 97112 NEUROMUSCULAR REEDUCATION: CPT | Mod: GO | Performed by: OCCUPATIONAL THERAPIST

## 2022-09-27 PROCEDURE — 92507 TX SP LANG VOICE COMM INDIV: CPT | Mod: GN | Performed by: SPEECH-LANGUAGE PATHOLOGIST

## 2022-09-27 PROCEDURE — 97110 THERAPEUTIC EXERCISES: CPT | Mod: GP,59 | Performed by: PHYSICAL THERAPIST

## 2022-09-27 PROCEDURE — 97535 SELF CARE MNGMENT TRAINING: CPT | Mod: GO | Performed by: OCCUPATIONAL THERAPIST

## 2022-09-27 PROCEDURE — 97129 THER IVNTJ 1ST 15 MIN: CPT | Mod: GN | Performed by: SPEECH-LANGUAGE PATHOLOGIST

## 2022-10-05 ENCOUNTER — HOSPITAL ENCOUNTER (OUTPATIENT)
Dept: OCCUPATIONAL THERAPY | Facility: CLINIC | Age: 52
Setting detail: THERAPIES SERIES
Discharge: HOME | End: 2022-10-05
Attending: FAMILY MEDICINE
Payer: COMMERCIAL

## 2022-10-05 ENCOUNTER — HOSPITAL ENCOUNTER (OUTPATIENT)
Dept: PHYSICAL THERAPY | Facility: CLINIC | Age: 52
Setting detail: THERAPIES SERIES
Discharge: HOME | End: 2022-10-05
Attending: FAMILY MEDICINE
Payer: COMMERCIAL

## 2022-10-05 DIAGNOSIS — R41.3 MEMORY LOSS: ICD-10-CM

## 2022-10-05 DIAGNOSIS — S06.0X9A CONCUSSION WITH LOSS OF CONSCIOUSNESS, INITIAL ENCOUNTER: ICD-10-CM

## 2022-10-05 DIAGNOSIS — F41.9 ANXIETY: ICD-10-CM

## 2022-10-05 PROCEDURE — 97112 NEUROMUSCULAR REEDUCATION: CPT | Mod: GP | Performed by: PHYSICAL THERAPIST

## 2022-10-05 PROCEDURE — 97140 MANUAL THERAPY 1/> REGIONS: CPT | Mod: GP | Performed by: PHYSICAL THERAPIST

## 2022-10-05 PROCEDURE — 97535 SELF CARE MNGMENT TRAINING: CPT | Mod: GO | Performed by: OCCUPATIONAL THERAPIST

## 2022-10-05 NOTE — DISCHARGE INSTRUCTIONS
"Semaj Perrin,   These are some of the things we talked about today, but we will continue to add to this list during our next session:    Recommendations when returning to work:  Use FL-41 glasses  Use the \"night light\" or similar blue-light filter on your screen  Sit back from the screen as much as you realistically can  Be sure you adhere to your 4 hour shifts  look away from computer at least 5 min every hour  focus on only single-tasks  when approaching your email, get an \"overview\" first.  Utilize outlook \"flags\" for follow-up, outlook folders, or a physical notebook to help stay on top of the tasks you may be behind on.    If another high-priority task does come up and can't be completed by another team member, be sure to shut down your email and only focus on that new task.         "
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
## 2022-10-06 ENCOUNTER — HOSPITAL ENCOUNTER (OUTPATIENT)
Dept: OCCUPATIONAL THERAPY | Facility: CLINIC | Age: 52
Setting detail: THERAPIES SERIES
Discharge: HOME | End: 2022-10-06
Attending: FAMILY MEDICINE
Payer: COMMERCIAL

## 2022-10-06 PROCEDURE — 97535 SELF CARE MNGMENT TRAINING: CPT | Mod: GO,GT,95 | Performed by: OCCUPATIONAL THERAPIST
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
## 2022-10-06 NOTE — PROGRESS NOTES
Marychuy Gomes is a 52 year old female who is being seen via a billable video visit.      Patient has given verbal consent for Video visit? Yes    Video Start Time: 12:30 pm    Telehealth Visit Details    Type of Service:  Telehealth    Video End Time (time video stopped): 1:10 pm    Originating Location (pt. location): Home    Additional Participants in Telehealth Visit: none    Distant Location (provider location):  [facility]     Mode of Communication (Audio Visual or Audio Only):  Audio visual    Latoya Mix, ORIANA  October 6, 2022
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
## 2022-10-06 NOTE — DISCHARGE INSTRUCTIONS
"Recommendations to help with returning to work:  For vision:  Use FL-41 glasses  Use the \"night light\" or similar blue-light filter on your screen: can adjust the strength of this filter under  night light settings   Sit back from the screen as much as you realistically can (you still need to be able to see the screen and reach the keyboard)  Enlarge things on your screen for increased ease of reading  Configure your computer to operate on ONE monitor only at first, and turn off the extra monitors (this is a lot of added visual stimulation)  Look away from computer at least 5 min every hour, more often as needed    General ergonomics (especially for your neck):    Use your chair to swivel when going from screen to tabletop (less neck strain)  Make sure computer height is adjusted to comfort for your neck  Consider adding in more tabletop space for when you do have paperwork to do. This avoids making  stacks  of things which can be harder to keep organized.    General Tips for your cognition and mental wellness:  Be sure you adhere to your 4 hour shifts!  Focus on only single-tasks (at least in the first few days, but this is a good practice to continue going forward as much as you are able)  When approaching your email, get an \"overview\" first.  Utilize outlook \"flags\" for follow-up, outlook folders, or a physical notebook to help stay on top of the tasks you may be behind on.    If another high-priority task does come up and can't be completed by another team member, be sure to shut down your email and only focus on that new task.  Monitor your symptoms like headache. If you start to notice a headache like you did today, implement something to intercept it (for example: deep breathing, walking, closing eyes, gentle neck stretches, change of position, medication, etc) as soon as you notice the headaches starting.  Pay attention to where you are feeling stress in your body (forehead, neck, low back, etc?)        We " can revise this list or add more during our next visit on Wednesday.  Keep easing back into screen-viewing over the next week (and keep track of any symptoms you notice) and have a great weekend!    MJ Lopez/L  [facility]  [phone]

## 2022-10-07 RX ORDER — DULOXETIN HYDROCHLORIDE 20 MG/1
CAPSULE, DELAYED RELEASE ORAL
Qty: 60 CAPSULE | Refills: 1 | OUTPATIENT
Start: 2022-10-07

## 2022-10-12 ENCOUNTER — HOSPITAL ENCOUNTER (OUTPATIENT)
Dept: OCCUPATIONAL THERAPY | Facility: CLINIC | Age: 52
Setting detail: THERAPIES SERIES
Discharge: HOME | End: 2022-10-12
Attending: FAMILY MEDICINE
Payer: COMMERCIAL

## 2022-10-12 PROCEDURE — 97535 SELF CARE MNGMENT TRAINING: CPT | Mod: GO,GT,95 | Performed by: OCCUPATIONAL THERAPIST

## 2022-10-12 NOTE — PROGRESS NOTES
Marychuy Gomes is a 52 year old female who is being seen via a billable video visit.      Patient has given verbal consent for Video visit? Yes    Video Start Time: 10:46    Telehealth Visit Details    Type of Service:  Telehealth    Video End Time (time video stopped): 11:28    Originating Location (pt. location): Home    Additional Participants in Telehealth Visit: None    Distant Location (provider location):  [facility]     Mode of Communication (Audio Visual or Audio Only):  Audio Visual    Latoya Mix, OTR/L  October 12, 2022

## 2022-10-12 NOTE — DISCHARGE INSTRUCTIONS
"Recommendations to help with returning to work:  For vision:  Use FL-41 glasses  Use the \"night light\" or similar blue-light filter on your screen: can adjust the strength of this filter under  night light settings   Sit back from the screen as much as you realistically can (you still need to be able to see the screen and reach the keyboard)  Enlarge things on your screen for increased ease of reading  Configure your computer to operate on ONE monitor only at first, and turn off the extra monitors (this is a lot of added visual stimulation)  Look away from computer at least 5 min every hour, more often as needed.  On these breaks, do your neck stretches and get up to walk around briefly if able.    General ergonomics (especially for your neck):    Use your chair to swivel when going from screen to tabletop (less neck strain)  Make sure computer height is adjusted to comfort for your neck  Consider adding in more tabletop space for when you do have paperwork to do. This avoids making  stacks  of things which can be harder to keep organized.  If using your laptop, position the laptop as close to eye level as you can, and use detachable screen.     General Tips for your cognition and mental wellness:  Be sure you adhere to your 4 hour shifts!  Set timers if you need to, to remind you that your shift is done.  Focus on only single-tasks (at least in the first few days, but this is a good practice to continue going forward as much as you are able)  When approaching your email, get an \"overview\" first.  Utilize outlook \"flags\" for follow-up, outlook folders, or a physical notebook to help stay on top of the tasks you may be behind on.    If another high-priority task does come up and can't be completed by another team member, be sure to shut down your email and only focus on that new task.  Monitor your symptoms like headache. If you start to notice a headache like you did today, implement something to intercept it (for " example: deep breathing, walking, closing eyes, gentle neck stretches, change of position, medication, etc) as soon as you notice the headaches starting.  Pay attention to where you are feeling stress in your body (forehead, neck, low back, etc?)  Make a note to yourself (on your computer or desk) that helps you to stay focused on the big picture (a mantra, Bible verse, etc).          I'm available by [employer], so feel free to message me if any questions come up.  I work Monday-Thursday.     MJ Lopez/L  [facility]  [phone]

## 2022-10-20 ENCOUNTER — HOSPITAL ENCOUNTER (OUTPATIENT)
Dept: OCCUPATIONAL THERAPY | Facility: CLINIC | Age: 52
Setting detail: THERAPIES SERIES
Discharge: HOME | End: 2022-10-20
Attending: FAMILY MEDICINE
Payer: COMMERCIAL

## 2022-10-20 PROCEDURE — 97535 SELF CARE MNGMENT TRAINING: CPT | Mod: GO,GT,95 | Performed by: OCCUPATIONAL THERAPIST

## 2022-10-20 NOTE — PROGRESS NOTES
"[facility]    Outpatient Occupational Therapy Progress Note  Patient: Marychuy Gomes  : 1970    Beginning/End Dates of Reporting Period:  22 to 2022      Referring Provider: Chris Stanley MD    Therapy Diagnosis: Concussion with loss of consciousness, initial encounter    Client Self Report: Marychuy started back to work last Thursday (works from home, doing 4 hour days until 10/27). Immediately had HA once turning on 2 monitors, needed to turn them off.  On Mon, returned to using both monitors and R eye muscle was \"burning.\" Tues used one screen, Wed used both screens and tolerated well.  She also has had respiratory symptoms and tested positive for Covid last Saturday 10/15. While she is overall adjusting well to doing 4 hour days, she feels a lot of anxiety about her workload and expectations coming from her boss; also has anxiety about driving to [place] for her upcoming concussion visit. She is still getting some flashing lights in her visual field (likely related to her retinal detachment, has visit with eye doctor next week) and has continued neck pain. Scheduled to see Dr. Akhtar on 10/28 but is not sure if she needs to keep this visit.    Objective Measurements:     Objective Measure: Concussion Symptom Assessment (CSA)   Details: 8       10/20/22 0800   Concussion Symptom Assessment   Headache or Pressure In Head 1 - mild   Upset Stomach or Throwing Up 0 - none   Problems with Balance 0 - none   Feeling Dizzy 0 - none   Sensitivity to Light 1 - mild   Sensitivity to Noise 0 - none   Mood Changes 0 - none   Feeling sluggish, hazy, or foggy 2 - mild to moderate   Trouble Concentrating, Lack of Focus 1 - mild   Motion Sickness 0 - none   Vision Changes 1 - mild  (related to retinal tear, will take time to clear per surgeon)   Memory Problems 1 - mild   Feeling " Confused 0 - none   Neck Pain 1 - mild   Trouble Sleeping 0 - none   Total Number of Symptoms 7   Symptom Severity Score 8              Goals:     Goal Identifier CSA score   Goal Description Client will successfully identify and utilize 3 strategies (adaptations, visual strategies, etc.) to decrease their post-concussion symptoms and report a score of 10 less on 3 consecutive visits on the concussion symptom assessment score for increased independence and participation during ADL/IADLs (community and  home tasks).   Target Date 11/27/22   Date Met      Progress (detail required for progress note): Goal in progress.  Was 8 this date (was 9 and 10 on Oct 5)     Goal Identifier visual accomodations   Goal Description Pt will implement at least 2 visual accommodations for decreased eye strain with computer use, to promote readiness for return to working from home with most of her work shift spent screen-viewing.   Target Date 11/27/22   Date Met      Progress (detail required for progress note): Reviewed all recommendations (pt reviewed her written checklist), and reports all have been going well except for using the nighttime mode on screen, seems too dark for her and increases eye strain, so she has stopped this.  Has been varying whether using 1 or 2 monitors.     Goal Identifier ADL compensatory strategies and adaptive equipment   Goal Description Pt will verbalize understanding of available adaptive equipment and compensatory strategies for dressing and bathing, to decrease concussion symptom triggers during daily self care routine.   Target Date 11/27/22   Date Met   Will follow-up at next visit, pt has not raised this as a continued concern.  She does report difficulty driving at 70 mph; visually/cognitively difficult to tolerate. Anxiety about driving to [place] in busier traffic (pre-concussion no problem with this).   Progress (detail required for progress note):       Goal Identifier memory and  concentration compensatory strategies   Goal Description Pt will implement at least 2 strategies to compensate for decreased memory and concentration, for improved ability to manage instrumental activities of daily living (bill paying, household management, work, etc).   Target Date 11/27/22   Date Met      Progress (detail required for progress note): Reviewed strategies for managing work tasks, including single-tasking, allowing self more time to complete tasks as needed, and deferring non-critical tasks as much as able.  Also deferring housework and unnecessary tasks as much as able while she is adjusting to return to work.         Plan:  Changes to therapy plan of care: 1-2 more visits to faciltiate increased ramping up to full time work, with instruction in accommodations as needed.     Discharge:  No

## 2022-10-20 NOTE — PROGRESS NOTES
Marychuy Gomes is a 52 year old female who is being seen via a billable video visit.      Patient has given verbal consent for Video visit? Yes    Video Start Time: 8:02    Telehealth Visit Details    Type of Service:  Telehealth    Video End Time (time video stopped): 8:50    Originating Location (pt. location): Home    Additional Participants in Telehealth Visit: None    Distant Location (provider location):   On-site    Mode of Communication (Audio Visual or Audio Only):  Audio visual    Latoya Mix, OTR/L  October 20, 2022

## 2022-10-21 ENCOUNTER — TELEPHONE (OUTPATIENT)
Dept: PALLIATIVE MEDICINE | Facility: CLINIC | Age: 52
End: 2022-10-21

## 2022-10-21 NOTE — TELEPHONE ENCOUNTER
Called pt to remind them of visit on 10/28/22 and pt was wondering if they could change to a virtual visit. Wasn't sure on the protocol so told them someone should get back to them early next week to change it to a virtual appointment if possible.    Xenia Weaver, EMT

## 2022-10-25 ENCOUNTER — TRANSFERRED RECORDS (OUTPATIENT)
Dept: HEALTH INFORMATION MANAGEMENT | Facility: CLINIC | Age: 52
End: 2022-10-25

## 2022-10-26 ENCOUNTER — HOSPITAL ENCOUNTER (OUTPATIENT)
Dept: PHYSICAL THERAPY | Facility: CLINIC | Age: 52
Setting detail: THERAPIES SERIES
Discharge: HOME | End: 2022-10-26
Attending: FAMILY MEDICINE
Payer: COMMERCIAL

## 2022-10-26 PROCEDURE — 97110 THERAPEUTIC EXERCISES: CPT | Mod: GP | Performed by: PHYSICAL THERAPIST

## 2022-10-26 NOTE — PROGRESS NOTES
[facility]    Outpatient Physical Therapy Progress Note  Patient: Marychuy Gomes  : 1970    Beginning/End Dates of Reporting Period:  1 session on 10- for a total of 9 sessions. She issed some time due to sindy COVID 19.     Referring Provider: Chris Stanley MD    Therapy Diagnosis: concussion with cervical, oculomotor and headache     Client Self Report: Feels pressure from supervisor to do more - beyond restrictions. Back to using 2 computer screens. Eye doctor - no restrictions, eye healing nicely. Had episode of R eye burning  - thinks from looking between 2 screens. Eye drops every 2 hours to help per eye doctor. She is working with OT with convergence. Completing exercises during breaks - every 5 minutes.    Objective Measurements:   Objective Measure: Cervical AROM seated  Details: Full seated cervical AROM except L rotation at 80% improved from 50%.  Objective Measure: Symptoms  Details: dizziness with looking at screen, with some neck dull to sharp pain with arm activities. L eye still an issue at times.  Objective Measure: canalith repositioning  Details: continues to use positioning and helps as does resting with neck supported and eyes closed.   Concussion symptom Rating from OT visit on 10- was 7-8     Goals:  Goal Identifier Driving   Goal Description Marychuy will be able to use positioning and exercises to eliminate her dizziness for safe driving   Target Date 22   Date Met      Progress (detail required for progress note): Being met however, concerns about heavier traffic - feels concerned and blocked in if too much traffic during a danny change, feels uncomfortable going more than 65 mph even if  is driving. Some anxiety with busy highways eg going to [facility] for appt.     Goal Identifier Work   Goal Description working with PT and OT for  posture and positioning, computer set up, Marychuy will be able to return to wor with restrictions as needed x 4 hours   Target Date  (based on provider recommendations and progress)   Date Met      Progress (detail required for progress note): Back to work x 4 hours and able to calm symptoms.     Goal Identifier Home program   Goal Description Marychuy will be able to complete a home program to improve her conditioning, strength and symptoms with a decrease in her Concussion Symptom Rating to less than 20   Target Date 10/30/22   Date Met  10/05/22   Progress (detail required for progress note): Walking more, completing home program as a rest break from computer. Likes the band exercises.     Plan:  Changes to therapy plan of care: 2-3 sessions over the next 4 weeks as needed.     Discharge:  No

## 2022-10-27 ENCOUNTER — HOSPITAL ENCOUNTER (OUTPATIENT)
Dept: OCCUPATIONAL THERAPY | Facility: CLINIC | Age: 52
Setting detail: THERAPIES SERIES
Discharge: HOME | End: 2022-10-27
Attending: FAMILY MEDICINE
Payer: COMMERCIAL

## 2022-10-27 PROCEDURE — 97112 NEUROMUSCULAR REEDUCATION: CPT | Mod: GO,GT,95 | Performed by: OCCUPATIONAL THERAPIST

## 2022-10-27 PROCEDURE — 97535 SELF CARE MNGMENT TRAINING: CPT | Mod: GO,GT,95 | Performed by: OCCUPATIONAL THERAPIST

## 2022-10-27 NOTE — PROGRESS NOTES
Marychuy Gomes is a 52 year old female who is being seen via a billable video visit.      Patient has given verbal consent for Video visit? Yes    Video Start Time: 1:26    Telehealth Visit Details    Type of Service:  Telehealth    Video End Time (time video stopped): 2:34    Originating Location (pt. location): Home    Additional Participants in Telehealth Visit: none    Distant Location (provider location):   On-site    Mode of Communication (Audio Visual or Audio Only):  Audio visual    Latoya Mix, OTR/L  October 27, 2022

## 2022-10-28 ENCOUNTER — VIRTUAL VISIT (OUTPATIENT)
Dept: PALLIATIVE MEDICINE | Facility: CLINIC | Age: 52
End: 2022-10-28
Attending: FAMILY MEDICINE
Payer: COMMERCIAL

## 2022-10-28 DIAGNOSIS — H81.10 BENIGN PAROXYSMAL POSITIONAL VERTIGO, UNSPECIFIED LATERALITY: ICD-10-CM

## 2022-10-28 DIAGNOSIS — S16.1XXA CERVICAL MYOFASCIAL STRAIN, INITIAL ENCOUNTER: Primary | ICD-10-CM

## 2022-10-28 DIAGNOSIS — S06.0X9A CONCUSSION WITH LOSS OF CONSCIOUSNESS, INITIAL ENCOUNTER: ICD-10-CM

## 2022-10-28 PROCEDURE — 99203 OFFICE O/P NEW LOW 30 MIN: CPT | Mod: 95 | Performed by: PHYSICAL MEDICINE & REHABILITATION

## 2022-10-28 RX ORDER — BISACODYL 5 MG
TABLET, DELAYED RELEASE (ENTERIC COATED) ORAL
Qty: 4 TABLET | Refills: 0 | Status: SHIPPED | OUTPATIENT
Start: 2022-10-28 | End: 2022-12-02

## 2022-10-28 RX ORDER — IBUPROFEN 200 MG
200 TABLET ORAL EVERY 4 HOURS PRN
COMMUNITY
End: 2022-12-02

## 2022-10-28 RX ORDER — LIDOCAINE 4 G/G
1 PATCH TOPICAL DAILY PRN
Qty: 30 PATCH | Refills: 3 | Status: SHIPPED | OUTPATIENT
Start: 2022-10-28 | End: 2022-11-27

## 2022-10-28 NOTE — NURSING NOTE
Chief Complaint   Patient presents with     Head Injury     Concussion on 7/12/22 with LOC     Change in headache pattern following last appointment     1.  Headache Frequency:  4-8 headache days per month.       2.  Headache Duration During this Injection Cycle:  2-72 headache hours per headache.       3.  Headache Intensity:      A.  6/10  =  Typical pain level.    B.  10/10  =  Worst pain level.    C.  1/10  =  Lowest pain level.       4.  Change in headache medication usage:  (For Example:  Able to decrease use of oral pain medications.) much less advil       5.  ER Visits:  None for headache       6.  Functional Performance:  Change in ADL's, social interaction, days lost from work, etc. No change, some anxiety      Xenia Weaver, EMT

## 2022-10-28 NOTE — LETTER
October 28, 2022        To Whom It May Concern:    Marychuy Gomes, is under my care for a concussion that occurred on 7/12/22.      May return to work as of 10/28/22 with the following restrictions:    The following accommodations may help in reducing the cognitive load, thereby minimizing post-concussion symptoms.  As the employer, it is encouraged to discuss and establish accommodations based on individual work responsibilities.  If symptoms persist, more formal accommodations may be necessary.    1)  Allow more time for, or delay for projects.  2)  Allow more time for work completion.  3)  Allow for reduced work load.  4)  Allow for less multi-task work.  Single tasks until completion will improve productivity.  5)  Allow breaks as needed to control symptom levels.  For example, if symptoms worsen during a specific task, project or meeting, She may need to leave that area temporarily or rest in a quiet area until symptoms improve.  6)  Provide a quiet area for lunch.  7)  Allow use of sunglasses during the day.     Full or partial days missed due to post-concussion symptoms and follow up appointments should be medically excused.  Follow up evaluation and revision of recommendations to occur on 1/28/22.    Please feel free to contact me at the number below with any questions or concerns.          Jorge Alberto Akhtar, DO

## 2022-11-03 ENCOUNTER — HOSPITAL ENCOUNTER (OUTPATIENT)
Dept: OCCUPATIONAL THERAPY | Facility: CLINIC | Age: 52
Setting detail: THERAPIES SERIES
Discharge: HOME | End: 2022-11-03
Attending: FAMILY MEDICINE
Payer: COMMERCIAL

## 2022-11-03 PROCEDURE — 97535 SELF CARE MNGMENT TRAINING: CPT | Mod: GO,GT,95 | Performed by: OCCUPATIONAL THERAPIST

## 2022-11-03 NOTE — PROGRESS NOTES
Marychuy Gomes is a 52 year old female who is being seen via a billable video visit.      Patient has given verbal consent for Video visit? Yes    Video Start Time: 12:35    Telehealth Visit Details    Type of Service:  Telehealth    Video End Time (time video stopped): 1:21    Originating Location (pt. location): Home    Additional Participants in Telehealth Visit: None    Distant Location (provider location):   On-site    Mode of Communication (Audio Visual or Audio Only):  Audio Visual    Latoya Mix, OTR/L  November 3, 2022

## 2022-11-04 ENCOUNTER — SURGERY (OUTPATIENT)
Age: 52
End: 2022-11-04
Payer: COMMERCIAL

## 2022-11-04 ENCOUNTER — HOSPITAL ENCOUNTER (OUTPATIENT)
Facility: AMBULATORY SURGERY CENTER | Age: 52
Discharge: HOME | End: 2022-11-04
Attending: FAMILY MEDICINE | Admitting: FAMILY MEDICINE
Payer: COMMERCIAL

## 2022-11-04 VITALS
TEMPERATURE: 97.1 F | RESPIRATION RATE: 16 BRPM | OXYGEN SATURATION: 97 % | HEART RATE: 77 BPM | SYSTOLIC BLOOD PRESSURE: 105 MMHG | DIASTOLIC BLOOD PRESSURE: 77 MMHG

## 2022-11-04 DIAGNOSIS — Z12.11 SCREEN FOR COLON CANCER: Primary | ICD-10-CM

## 2022-11-04 LAB — COLONOSCOPY: NORMAL

## 2022-11-04 PROCEDURE — 45378 DIAGNOSTIC COLONOSCOPY: CPT

## 2022-11-04 PROCEDURE — G0121 COLON CA SCRN NOT HI RSK IND: HCPCS | Performed by: FAMILY MEDICINE

## 2022-11-04 PROCEDURE — 99152 MOD SED SAME PHYS/QHP 5/>YRS: CPT | Mod: 59 | Performed by: FAMILY MEDICINE

## 2022-11-04 PROCEDURE — G8907 PT DOC NO EVENTS ON DISCHARG: HCPCS

## 2022-11-04 PROCEDURE — G8918 PT W/O PREOP ORDER IV AB PRO: HCPCS

## 2022-11-04 RX ORDER — ONDANSETRON 2 MG/ML
4 INJECTION INTRAMUSCULAR; INTRAVENOUS
Status: COMPLETED | OUTPATIENT
Start: 2022-11-04 | End: 2022-11-04

## 2022-11-04 RX ORDER — SODIUM CHLORIDE, SODIUM LACTATE, POTASSIUM CHLORIDE, CALCIUM CHLORIDE 600; 310; 30; 20 MG/100ML; MG/100ML; MG/100ML; MG/100ML
INJECTION, SOLUTION INTRAVENOUS CONTINUOUS
Status: DISCONTINUED | OUTPATIENT
Start: 2022-11-04 | End: 2022-11-05 | Stop reason: HOSPADM

## 2022-11-04 RX ORDER — FENTANYL CITRATE 50 UG/ML
INJECTION, SOLUTION INTRAMUSCULAR; INTRAVENOUS PRN
Status: DISCONTINUED | OUTPATIENT
Start: 2022-11-04 | End: 2022-11-04 | Stop reason: HOSPADM

## 2022-11-04 RX ORDER — LIDOCAINE 40 MG/G
CREAM TOPICAL
Status: DISCONTINUED | OUTPATIENT
Start: 2022-11-04 | End: 2022-11-05 | Stop reason: HOSPADM

## 2022-11-04 RX ADMIN — FENTANYL CITRATE 100 MCG: 50 INJECTION, SOLUTION INTRAMUSCULAR; INTRAVENOUS at 07:34

## 2022-11-04 RX ADMIN — FENTANYL CITRATE 50 MCG: 50 INJECTION, SOLUTION INTRAMUSCULAR; INTRAVENOUS at 07:37

## 2022-11-04 RX ADMIN — ONDANSETRON 4 MG: 2 INJECTION INTRAMUSCULAR; INTRAVENOUS at 07:06

## 2022-11-04 RX ADMIN — FENTANYL CITRATE 50 MCG: 50 INJECTION, SOLUTION INTRAMUSCULAR; INTRAVENOUS at 07:43

## 2022-11-04 NOTE — H&P
Pre-Endoscopy History and Physical     Marychuy Gomes MRN# [id number]   YOB: 1970 Age: 52 year old     Date of Procedure: 11/4/2022  Primary care provider: Betzaida Cruz  Type of Endoscopy: colonoscopy  Reason for Procedure: screening  Type of Anesthesia Anticipated: Moderate Sedation    HPI:    Marychuy is a 52 year old female who will be undergoing the above procedure.      A history and physical has been performed. The patient's medications and allergies have been reviewed. The risks and benefits of the procedure and the sedation options and risks were discussed with the patient.  All questions were answered and informed consent was obtained.      She denies a personal or family history of anesthesia complications or bleeding disorders.     Allergies   Allergen Reactions     Codeine Nausea and Vomiting     Codeine         Cannot display prior to admission medications because the patient has not been admitted in this contact.       Patient Active Problem List   Diagnosis     CARDIOVASCULAR SCREENING; LDL GOAL LESS THAN 160     Sensitive to smells     Epigastric pain     Cervical high risk HPV (human papillomavirus) test positive     Vitamin D deficiency        Past Medical History:   Diagnosis Date     Cervical high risk HPV (human papillomavirus) test positive 5/17/16 5/17/16 NIL/+ HR HPV other.         Past Surgical History:   Procedure Laterality Date     EYE SURGERY       GYN SURGERY       LASIK BILATERAL Bilateral      SURGICAL HISTORY OF -   01/01/1993    exploratory lap       Social History     Tobacco Use     Smoking status: Never     Smokeless tobacco: Never   Substance Use Topics     Alcohol use: No       Family History   Problem Relation Age of Onset     Cerebrovascular Disease Father      Pancreatic Cancer Maternal Grandfather      Prostate Cancer Paternal Uncle        REVIEW OF SYSTEMS:     5 point ROS negative except as noted above in HPI, including Gen., Resp., CV, GI &   "system review.      PHYSICAL EXAM:   BP (!) 147/88   Pulse 108   Temp 97.1  F (36.2  C) (Temporal)   Resp 16   SpO2 100%  Estimated body mass index is 26.16 kg/m  as calculated from the following:    Height as of 8/19/22: 1.655 m (5' 5.16\").    Weight as of 9/12/22: 71.7 kg (158 lb).   GENERAL APPEARANCE: healthy, alert and no distress  MENTAL STATUS: alert and oriented x 3  AIRWAY EXAM: Mallampatti Class II (visualization of the soft palate, fauces, and uvula)  RESP: lungs clear to auscultation - no rales, rhonchi or wheezes  CV: regular rates and rhythm and normal S1 S2, no S3 or S4      DIAGNOSTICS:    Not indicated      IMPRESSION   ASA Class 2 - Mild systemic disease        PLAN:       Plan for colonoscopy. We discussed the risks, benefits and alternatives and the patient wished to proceed.    The above has been forwarded to the consulting provider.      Signed Electronically by: Velma Valentine MD  November 4, 2022    "

## 2022-11-08 ENCOUNTER — HOSPITAL ENCOUNTER (OUTPATIENT)
Dept: PHYSICAL THERAPY | Facility: CLINIC | Age: 52
Setting detail: THERAPIES SERIES
Discharge: HOME | End: 2022-11-08
Attending: FAMILY MEDICINE
Payer: COMMERCIAL

## 2022-11-08 DIAGNOSIS — S06.0X9A CONCUSSION WITH LOSS OF CONSCIOUSNESS, INITIAL ENCOUNTER: ICD-10-CM

## 2022-11-08 PROCEDURE — 97112 NEUROMUSCULAR REEDUCATION: CPT | Mod: GP | Performed by: PHYSICAL THERAPIST

## 2022-11-09 ENCOUNTER — HOSPITAL ENCOUNTER (OUTPATIENT)
Dept: OCCUPATIONAL THERAPY | Facility: CLINIC | Age: 52
Setting detail: THERAPIES SERIES
Discharge: HOME | End: 2022-11-09
Attending: FAMILY MEDICINE
Payer: COMMERCIAL

## 2022-11-09 PROCEDURE — 97112 NEUROMUSCULAR REEDUCATION: CPT | Mod: GO | Performed by: OCCUPATIONAL THERAPIST

## 2022-11-10 ENCOUNTER — TELEPHONE (OUTPATIENT)
Dept: OPHTHALMOLOGY | Facility: CLINIC | Age: 52
End: 2022-11-10

## 2022-11-15 ENCOUNTER — HOSPITAL ENCOUNTER (OUTPATIENT)
Dept: PHYSICAL THERAPY | Facility: CLINIC | Age: 52
Setting detail: THERAPIES SERIES
Discharge: HOME | End: 2022-11-15
Attending: FAMILY MEDICINE
Payer: COMMERCIAL

## 2022-11-15 ENCOUNTER — TELEPHONE (OUTPATIENT)
Dept: OPHTHALMOLOGY | Facility: CLINIC | Age: 52
End: 2022-11-15

## 2022-11-15 PROCEDURE — 97110 THERAPEUTIC EXERCISES: CPT | Mod: GP | Performed by: PHYSICAL THERAPIST

## 2022-11-15 NOTE — TELEPHONE ENCOUNTER
Spoke with patient regarding scheduling a sooner appointment from the wait-list with another Provider, . Patient was able to reschedule as offered. Patient was provided appointment information over the phone [facility] -Per Patient

## 2022-11-16 ENCOUNTER — HOSPITAL ENCOUNTER (OUTPATIENT)
Dept: OCCUPATIONAL THERAPY | Facility: CLINIC | Age: 52
Setting detail: THERAPIES SERIES
Discharge: HOME | End: 2022-11-16
Attending: FAMILY MEDICINE
Payer: COMMERCIAL

## 2022-11-16 ENCOUNTER — MYC MEDICAL ADVICE (OUTPATIENT)
Dept: PHYSICAL MEDICINE AND REHAB | Facility: CLINIC | Age: 52
End: 2022-11-16

## 2022-11-16 PROCEDURE — 97535 SELF CARE MNGMENT TRAINING: CPT | Mod: GO | Performed by: OCCUPATIONAL THERAPIST

## 2022-11-16 PROCEDURE — 97112 NEUROMUSCULAR REEDUCATION: CPT | Mod: GO | Performed by: OCCUPATIONAL THERAPIST

## 2022-11-16 NOTE — PROGRESS NOTES
[facility]    Outpatient Occupational Therapy Progress Note  Patient: Marychuy Gomes  : 1970    Beginning/End Dates of Reporting Period:  22 to 2022      Referring Provider: Chris Stanley MD    Therapy Diagnosis: Concussion with loss of consciousness, initial encounter      Client Self Report: Pt had some difficulty driving home after last week's OT session (darker outside, raining), but tolerated the drive in today with snowy weather.  At work, still doing 4 hour days until . She has resumed doing her work on dual computer monitors and has been tolerating well, with some increased neck pain (takes occasional Advil).  She is using FL-41 glasses when she notices eye sensitivity.  Overall tolerates work best when lighting contrast is minimized (i.e. lights and lamp on when looking at screens, more difficulty looking at screen when it's dark in the room or outside her window).  Planning to start using a posture brace for shoulders to help with improved ergonomics with screentime.  Work has hired a temp to help with extra workload while she is on restrictions (and to help with overall department workload).    Objective Measurements:    Objective Measure: Concussion Symptom Assessment (CSA)   Details: 4     Objective Measure: Dynavision, mode A, 60 seconds   Details: 66, tolerated well, score is WNL     Objective Measure: Dynavision Mode B, 3 minutes   Details: 186/190 (97% accuracy in hitting targets over a 3 min period).  Improved from 69% during a 1 min trial last week.          Outcome Measures (most recent score):  Concussion Symptom Assessment (score out of 90). A higher score indicates greater impairment: 4    Goals:     Goal Identifier CSA score   Goal Description Client will successfully identify and utilize 3 strategies (adaptations, visual strategies, etc.) to  "decrease their post-concussion symptoms and report a score of 10 less on 3 consecutive visits on the concussion symptom assessment score for increased independence and participation during ADL/IADLs (community and  home tasks).   Target Date 11/27/22   Date Met      Progress (detail required for progress note): Goal achieved during last 2 sessions (score was 7 and 4), with a score of 14 on 10/28     Goal Identifier visual accomodations   Goal Description Pt will implement at least 2 visual accommodations for decreased eye strain with computer use, to promote readiness for return to working from home with most of her work shift spent screen-viewing.   Target Date 11/27/22   Date Met      Progress (detail required for progress note): Pt with improving visual tolerance for 2 screens, with occasional neck pain.  Has noted that having lights on in the room helps more when looking at screens. Wears FL-41 \"migraine\" glasses as needed for visual sensitivity.     Goal Identifier ADL compensatory strategies and adaptive equipment   Goal Description Pt will verbalize understanding of available adaptive equipment and compensatory strategies for dressing and bathing, to decrease concussion symptom triggers during daily self care routine.   Target Date 11/27/22   Date Met      Progress (detail required for progress note): Pt states she is 98% better with tolerating showering.  She has occasional symptoms when closing eyes and making position changes to grab the shampoo.  Demo's understanding of recommendation to try positioining soap/shampoo at higher surface or using a wall-mounted soap dispenser to decrease position changes in the shower.     Goal Identifier memory and concentration compensatory strategies   Goal Description Pt will implement at least 2 strategies to compensate for decreased memory and concentration, for improved ability to manage instrumental activities of daily living (bill paying, household management, work, " "etc).   Target Date 22   Date Met      Progress (detail required for progress note): Taking on increased workload.  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          Plan: Continue OT for 1-3 more sessions. For continued ramping up to full time work, would ideally recommend increasing to 6 hour days next week if this could be accommodated by her work and allowed by her short term disability. Therapist reinforced continuation of Dr. Akhtar's workplace accommodations letter even after she has returned to 8 hour days.        Changes to therapy plan of care: Planning to transfer to [facility], as this provider is leaving the [facility] and no other concussion OT available at [employer].  The [facility] is also closer to pt's home, and she is receiving PT there.    Discharge:  No    MJ Lopez/L  [facility]  [phone]    "

## 2022-11-20 ENCOUNTER — HEALTH MAINTENANCE LETTER (OUTPATIENT)
Age: 52
End: 2022-11-20

## 2022-11-22 ENCOUNTER — TRANSFERRED RECORDS (OUTPATIENT)
Dept: HEALTH INFORMATION MANAGEMENT | Facility: CLINIC | Age: 52
End: 2022-11-22

## 2022-11-28 ENCOUNTER — HOSPITAL ENCOUNTER (OUTPATIENT)
Dept: OCCUPATIONAL THERAPY | Facility: CLINIC | Age: 52
Setting detail: THERAPIES SERIES
Discharge: HOME | End: 2022-11-28
Attending: FAMILY MEDICINE
Payer: COMMERCIAL

## 2022-11-28 PROCEDURE — 97535 SELF CARE MNGMENT TRAINING: CPT | Mod: GO | Performed by: SPECIALIST/TECHNOLOGIST

## 2022-11-28 PROCEDURE — 97112 NEUROMUSCULAR REEDUCATION: CPT | Mod: GO | Performed by: SPECIALIST/TECHNOLOGIST

## 2022-11-29 ENCOUNTER — HOSPITAL ENCOUNTER (OUTPATIENT)
Dept: PHYSICAL THERAPY | Facility: CLINIC | Age: 52
Setting detail: THERAPIES SERIES
Discharge: HOME | End: 2022-11-29
Attending: FAMILY MEDICINE
Payer: COMMERCIAL

## 2022-11-29 PROCEDURE — 97112 NEUROMUSCULAR REEDUCATION: CPT | Mod: GP | Performed by: PHYSICAL THERAPIST
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
## 2022-12-02 ENCOUNTER — OFFICE VISIT (OUTPATIENT)
Dept: OPHTHALMOLOGY | Facility: CLINIC | Age: 52
End: 2022-12-02
Attending: PHYSICAL MEDICINE & REHABILITATION
Payer: COMMERCIAL

## 2022-12-02 DIAGNOSIS — H33.312 HORSESHOE RETINAL TEAR OF LEFT EYE: ICD-10-CM

## 2022-12-02 DIAGNOSIS — F07.81 POST CONCUSSION SYNDROME: Primary | ICD-10-CM

## 2022-12-02 PROCEDURE — 92002 INTRM OPH EXAM NEW PATIENT: CPT

## 2022-12-02 ASSESSMENT — CUP TO DISC RATIO
OD_RATIO: 0.3
OS_RATIO: 0.3

## 2022-12-02 ASSESSMENT — TONOMETRY
OS_IOP_MMHG: 16
OD_IOP_MMHG: 14
IOP_METHOD: ICARE

## 2022-12-02 ASSESSMENT — SLIT LAMP EXAM - LIDS
COMMENTS: NORMAL
COMMENTS: NORMAL

## 2022-12-02 ASSESSMENT — VISUAL ACUITY
OS_PH_SC: 20/25
OS_SC: 20/50
OS_PH_SC+: -2
OD_SC+: -2
OD_SC: 20/20
METHOD: SNELLEN - LINEAR

## 2022-12-02 ASSESSMENT — CONF VISUAL FIELD
OS_SUPERIOR_NASAL_RESTRICTION: 0
OD_SUPERIOR_NASAL_RESTRICTION: 0
OD_NORMAL: 1
OD_INFERIOR_TEMPORAL_RESTRICTION: 0
OD_INFERIOR_NASAL_RESTRICTION: 0
OD_SUPERIOR_TEMPORAL_RESTRICTION: 0
OS_NORMAL: 1
METHOD: COUNTING FINGERS
OS_SUPERIOR_TEMPORAL_RESTRICTION: 0
OS_INFERIOR_TEMPORAL_RESTRICTION: 0
OS_INFERIOR_NASAL_RESTRICTION: 0

## 2022-12-02 ASSESSMENT — EXTERNAL EXAM - LEFT EYE: OS_EXAM: NORMAL

## 2022-12-02 ASSESSMENT — EXTERNAL EXAM - RIGHT EYE: OD_EXAM: NORMAL
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
## 2022-12-02 NOTE — PROGRESS NOTES
Assessment and Plan:  1) Post-concussion syndrome  A: Improved photophobia since fall on 7/12/22, asymptomatic CI.  Overall, recovering well.  P:  Patient educated on condition.  Assured patient that visual symptoms are normal and consistent with post-concussion syndrome and may continue to improve with time.  Continue with VT exercises as recommended by PT (patient uses [employer] at home).  Recommended low power OTC readers prn.  Monitor annually.      2) Horseshoe retinal tear, left eye  A: s/p laser, with resolving vitreous hemorrhage  P: Patient had questions regarding limitations with exercise/PT.  Patient may proceed without physical limitations at this time.  Reviewed symptoms of RD; instructed patient to seek care immediately in the event of flashes, new floaters, or curtain/veil over vision.  Continue care at [facility] as directed.    Complete documentation of historical and exam elements from today's encounter can be found in the full encounter summary report (not reduplicated in this progress  note). I personally obtained the chief complaint(s) and history of present illness. I  confirmed and edited as necessary the review of systems, past medical/surgical  history, family history, social history, and examination findings as documented by  others; and I examined the patient myself. I personally reviewed the relevant tests,  images, and reports as documented above. I formulated and edited as necessary the  assessment and plan and discussed the findings and management plan with the  patient and family.    Merline Nicole, JUAN

## 2023-01-02 ENCOUNTER — HOSPITAL ENCOUNTER (OUTPATIENT)
Dept: OCCUPATIONAL THERAPY | Facility: CLINIC | Age: 53
Setting detail: THERAPIES SERIES
Discharge: HOME | End: 2023-01-02
Attending: FAMILY MEDICINE
Payer: COMMERCIAL

## 2023-01-02 PROCEDURE — 97535 SELF CARE MNGMENT TRAINING: CPT | Mod: GO | Performed by: SPECIALIST/TECHNOLOGIST

## 2023-01-02 NOTE — PROGRESS NOTES
[facility]    Outpatient Occupational Therapy Discharge Note  Patient: Marychuy Gomes  : 1970    Beginning/End Dates of Reporting Period:  2022 to 2023    Referring Provider: Dr. Chris Stanley    Therapy Diagnosis: Impaired ADL/IADLs    Client Self Report: Patient reports she has returned to full time work with minimal to no exacerbation of symptoms and feels she is managing this well. 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 Neuroopthamologist indicated these should continue to improve with time. Patient currently has headache, contributing to increased score this date.        Goals:     Goal Identifier CSA score   Goal Description Client will successfully identify and utilize 3 strategies (adaptations, visual strategies, etc.) to decrease their post-concussion symptoms and report a score of 10 less on 3 consecutive visits on the concussion symptom assessment score for increased independence and participation during ADL/IADLs (community and  home tasks).   Target Date 22   Date Met  22   Progress (detail required for progress note): 23:Patient scored 13 this date, indicating she does currently have a headache but that symptoms have generally been increasing and that score would typically be far less - Patient reports she has heacaches infrequently to date. Previously, goal achieved over last three sessions, scoring 7 on , 4 on , 7 .     Goal Identifier visual accomodations   Goal Description Pt will implement at least 2 visual accommodations for decreased eye strain with computer use, to promote readiness for  return to working from home with most of her work shift spent screen-viewing.   Target Date 12/28/22   Date Met  11/28/22   Progress (detail required for progress note): Goal Previously Met 11/28/23, Patient currently using dual monitors with 5 min break every hour and use of increased backlighting with screens.  Reports continued benefit from FL-41 glasses for eye sensitivity     Goal Identifier ADL compensatory strategies and adaptive equipment   Goal Description Pt will verbalize understanding of available adaptive equipment and compensatory strategies for dressing and bathing, to decrease concussion symptom triggers during daily self care routine.   Target Date 12/28/22   Date Met  01/02/23   Progress (detail required for progress note): Patient reports decreased freqency of symptoms with position changes during self care tasks. Is receptive to education on slow and controlled motions with position changes. She denies further concerns at this time.     Goal Identifier memory and concentration compensatory strategies   Goal Description Pt will implement at least 2 strategies to compensate for decreased memory and concentration, for improved ability to manage instrumental activities of daily living (bill paying, household management, work, etc).   Target Date 12/28/22   Date Met  01/02/23   Progress (detail required for progress note): Patient reports returning to full 8 hour work days with minimal concerns. Reports using learned strategies with some lingering challenges with organization of information. Patient reports she has created a spreadsheet for task tracking and her employer has been supportive of increased time needed to complete tasks. She denies further concerns at this time.     Plan:  Discharge from therapy.    Reason for Discharge: Patient has met all goals.    Discharge Plan: Patient to continue home program, implementation of learned strategies for symptom management and visual accommodations.

## 2023-01-05 ENCOUNTER — TELEPHONE (OUTPATIENT)
Dept: PHYSICAL THERAPY | Facility: CLINIC | Age: 53
End: 2023-01-05

## 2023-01-19 NOTE — DISCHARGE SUMMARY
[facility]    Outpatient Physical Therapy Discharge Note  Patient: Marychuy Gomes  : 1970    Beginning/End Dates of Reporting Period:  Total of 12 sessions    Referring Provider: Chris Stanley MD    Therapy Diagnosis: concussion with cervical, oculomotor and headache     Client Self Report: At the time of her last appt:  Had a hemmorrhage of the L eye and no action as she is seeing neuro optometrist. NEck tight - not exercising due to eye. R eye is getting more tired and sore.    Objective Measurements:  See 2022 note for details    Outcome Measures (most recent score):  See 2022 note for details      Goals:  Goal Identifier Driving   Goal Description Marychuy will be able to use positioning and exercises to eliminate her dizziness for safe driving   Target Date 22   Date Met  22   Progress (detail required for progress note):       Goal Identifier Work   Goal Description working with PT and OT for posture and positioning, computer set up, Marychuy will be able to return to work with restrictions as needed x 4 hours   Target Date     Date Met  22   Progress (detail required for progress note): Up to 6 hours     Goal Identifier Home program   Goal Description Marychuy will be able to complete a home program to improve her conditioning, strength and symptoms with a decrease in her Concussion Symptom Rating to less than 20   Target Date 10/30/22   Date Met  22   Progress (detail required for progress note): 9 today up from 7     Plan:  Discharge from therapy.    Discharge:    Reason for Discharge: Marychuy did not return follow up call. Based on OT discharge note, she is back to work and has minimal complications at this time.     Equipment Issued: band    Discharge Plan: Patient to continue home program.

## 2023-01-27 ENCOUNTER — TRANSFERRED RECORDS (OUTPATIENT)
Dept: HEALTH INFORMATION MANAGEMENT | Facility: CLINIC | Age: 53
End: 2023-01-27

## 2023-03-08 NOTE — PROGRESS NOTES
[facility]    Outpatient Speech Language Pathology Discharge Note  Patient: Marychuy Gomes  : 1970    Beginning/End Dates of Reporting Period:  22 to 22    Referring Provider: Chris Stanley MD,    Therapy Diagnosis: mild cognitive linguistic deficits     Client Self Report: Patient arrived on time to session eager to begin. Patient reporting that she has become disoriented at times/forgeting where she is going, but this has really improved over the past week.     Objective Measurements: Please see goals and progress below.     Goals:  Goal Identifier Internal Memory Strategies   Goal Description In order to improve memory skills, patient will demonstrate understanding and implementation of 3 internal memory strategies with 80% accuracy as measured by SLP and patient report.   Target Date 12/10/22   Date Met      Progress (detail required for progress note):  Met x1. Pt only seen for 2 sessions.     Goal Identifier External Memory Strategies   Goal Description In order to improve memory skills, patient will demonstrate understanding and implementation of 3 external memory strategies with 80% accuracy as measured by SLP and patient report.   Target Date 22   Date Met      Progress (detail required for progress note):  Met x1. Pt only seen for 2 sessions.       Goal Identifier Word Find   Goal Description In order to improve word finding skills, patient will complete complex word find tasks with 90% accuracy given min cues.   Target Date 12/10/22   Date Met      Progress (detail required for progress note):  Met x1. Pt only seen for 2 sessions.     Plan:  Discharge from therapy.    Discharge:    Reason for Discharge: Patient has failed to schedule further appointments.    Equipment Issued: N/A    Discharge Plan: Patient to continue home program.

## 2023-05-05 NOTE — ED PROVIDER NOTES
History     Chief Complaint   Patient presents with     Fall     The history is provided by the patient, the spouse and medical records.     Marychuy Gomes is a 51 year old female here after a fall. She [place]  [place], [place] in from [place]. She backed the trailer down and when she got out of the vehicle she slipped on the green slime on the boat landing and fell. He was in the boat, got it onto the trailer and then realized he could not see her. He found her on the ground unconscious. It was about two minutes, by his estimate, before she was conscious. She says that when she woke up and looked up at her  she could no hear him talking to her. Here in the ED she has neck pain and pain in the back of her head. No other injury. She denies alcohol today and states that she does not drink.     No daily meds.     Allergies:  Allergies   Allergen Reactions     Codeine Nausea and Vomiting       Problem List:    Patient Active Problem List    Diagnosis Date Noted     Excessive or frequent menstruation 04/27/2018     Priority: Medium     Vitamin D deficiency 03/26/2018     Priority: Medium     Epigastric pain 05/17/2016     Priority: Medium     Cervical high risk HPV (human papillomavirus) test positive 05/17/2016     Priority: Medium     5/17/16 NIL/+ HR HPV other. Plan: cotest in 1 year.   3/26/18 NIL pap, neg HR HPV, also showing endometrial cells. LMP was 03/21/2018. Plan: Gyn follow up for endo cells. Cotest due in 3 years for pap.  4/27/18 EMB - unable to pass through cervical os. Plan: pelvic US and schedule visit to discuss options.   5/7/18 Pelvic US.        Sensitive to smells 02/02/2011     Priority: Medium     CARDIOVASCULAR SCREENING; LDL GOAL LESS THAN 160 10/31/2010     Priority: Medium        Past Medical History:    Past Medical History:   Diagnosis Date     Cervical high risk HPV (human papillomavirus) test positive 5/17/16       Past Surgical History:    Past  Surgical History:   Procedure Laterality Date     SURGICAL HISTORY OF -   1993    exploratory lap       Family History:    Family History   Problem Relation Age of Onset     Cerebrovascular Disease Father      Pancreatic Cancer Maternal Grandfather      Prostate Cancer Paternal Uncle        Social History:  Marital Status:   [2]  Social History     Tobacco Use     Smoking status: Never Smoker     Smokeless tobacco: Never Used   Substance Use Topics     Alcohol use: No     Drug use: No        Medications:    Acetaminophen (TYLENOL PO)  fluocinonide (LIDEX) 0.05 % external cream  fluocinonide (LIDEX) 0.05 % external solution  NO ACTIVE MEDICATIONS          Review of Systems   Musculoskeletal: Positive for neck pain.   Neurological: Positive for headaches.   All other systems reviewed and are negative.      Physical Exam   BP: (!) 161/86  Pulse: 112  Temp: 99.2  F (37.3  C)  Resp: 18  SpO2: 100 %      Physical Exam  Vitals and nursing note reviewed.   Constitutional:       General: She is not in acute distress.     Appearance: Normal appearance. She is normal weight. She is not ill-appearing.   HENT:      Head: Normocephalic and atraumatic.      Comments: No blood from the scalp     Right Ear: Tympanic membrane, ear canal and external ear normal.      Left Ear: Tympanic membrane, ear canal and external ear normal.      Nose: Nose normal.      Mouth/Throat:      Mouth: Mucous membranes are moist.      Pharynx: Oropharynx is clear.   Eyes:      Extraocular Movements: Extraocular movements intact.      Conjunctiva/sclera: Conjunctivae normal.      Pupils: Pupils are equal, round, and reactive to light.   Neck:      Comments: She has midline neck pain and tenderness.  Cardiovascular:      Rate and Rhythm: Normal rate and regular rhythm.      Pulses: Normal pulses.   Pulmonary:      Effort: Pulmonary effort is normal. No respiratory distress.      Breath sounds: Normal breath sounds.   Musculoskeletal:          General: No swelling, tenderness or deformity.      Cervical back: No rigidity.   Skin:     General: Skin is warm and dry.   Neurological:      General: No focal deficit present.      Mental Status: She is alert and oriented to person, place, and time.   Psychiatric:         Mood and Affect: Mood normal.         Behavior: Behavior normal.         Results for orders placed or performed during the hospital encounter of 07/12/22 (from the past 24 hour(s))   CBC with platelets differential    Narrative    The following orders were created for panel order CBC with platelets differential.  Procedure                               Abnormality         Status                     ---------                               -----------         ------                     CBC with platelets and d...[[id number]]  Abnormal            Final result                 Please view results for these tests on the individual orders.   INR   Result Value Ref Range    INR 1.00 0.85 - 1.15   Basic metabolic panel   Result Value Ref Range    Sodium 139 134 - 144 mmol/L    Potassium 3.5 3.5 - 5.1 mmol/L    Chloride 104 98 - 107 mmol/L    Carbon Dioxide (CO2) 24 21 - 31 mmol/L    Anion Gap 11 3 - 14 mmol/L    Urea Nitrogen 10 7 - 25 mg/dL    Creatinine 0.85 0.60 - 1.20 mg/dL    Calcium 9.4 8.6 - 10.3 mg/dL    Glucose 116 (H) 70 - 105 mg/dL    GFR Estimate 82 >60 mL/min/1.73m2   Ethanol GH   Result Value Ref Range    Alcohol ethyl <0.01 <=0.01 g/dL   CBC with platelets and differential   Result Value Ref Range    WBC Count 14.8 (H) 4.0 - 11.0 10e3/uL    RBC Count 4.79 3.80 - 5.20 10e6/uL    Hemoglobin 12.7 11.7 - 15.7 g/dL    Hematocrit 38.1 35.0 - 47.0 %    MCV 80 78 - 100 fL    MCH 26.5 26.5 - 33.0 pg    MCHC 33.3 31.5 - 36.5 g/dL    RDW 13.3 10.0 - 15.0 %    Platelet Count 359 150 - 450 10e3/uL    % Neutrophils 79 %    % Lymphocytes 12 %    % Monocytes 6 %    % Eosinophils 1 %    % Basophils 1 %    % Immature Granulocytes 1 %    NRBCs per 100 WBC 0  <1 /100    Absolute Neutrophils 11.9 (H) 1.6 - 8.3 10e3/uL    Absolute Lymphocytes 1.8 0.8 - 5.3 10e3/uL    Absolute Monocytes 0.8 0.0 - 1.3 10e3/uL    Absolute Eosinophils 0.1 0.0 - 0.7 10e3/uL    Absolute Basophils 0.1 0.0 - 0.2 10e3/uL    Absolute Immature Granulocytes 0.1 <=0.4 10e3/uL    Absolute NRBCs 0.0 10e3/uL   CT Head w/o Contrast    Narrative    PROCEDURE: CT HEAD W/O CONTRAST     HISTORY: Headache; Trauma, acute/subacute, without suspected cervical  artery trauma.    COMPARISON: None.    TECHNIQUE:  Helical images of the head from the foramen magnum to the  vertex were obtained without contrast.    FINDINGS: The ventricles and sulci are normal in volume. No acute  intracranial hemorrhage, mass effect, midline shift, hydrocephalus or  basilar cystern effacement are present.    The grey-white matter interface is preserved.    The calvarium is intact. The mastoid air cells are clear.  The  visualized paranasal sinuses are clear.      Impression    IMPRESSION: Normal brain      TIEN TIDWELL MD         SYSTEM ID:  [id number]   CT Cervical Spine w/o Contrast    Narrative    PROCEDURE: CT CERVICAL SPINE W/O CONTRAST 7/12/2022 5:45 PM    HISTORY: Neck pain; Trauma; Mild/moderate trauma; None of the  following: Spondyloarthropathy, cervical x-ray with negative result,  questionable finding, or inadequate coverage    COMPARISONS: None.    Meds/Dose Given:    TECHNIQUE: ET scan of the cervical spine with sagittal coronal  reconstructions    FINDINGS: The cervical disks are normal in height. Cervical facet  joints are normal. There are no fractures of the cervical vertebral  bodies or arches. Degenerative osteophytes are seen in the upper  thoracic spine. The prevertebral soft tissues are normal.         Impression    IMPRESSION: Normal cervical spine    TIEN TIDWELL MD         SYSTEM ID:  [id number]       Medications   acetaminophen (TYLENOL) tablet 1,000 mg (1,000 mg Oral Given 7/12/22 1171)       Assessments  & Plan (with Medical Decision Making)  Marychuy Gomes is a 51 year old female here after a fall. [place]. She backed the trailer down and when she got out of the vehicle she slipped on the green slime on the boat landing and fell. He was in the boat, got it onto the trailer and then realized he could not see her. He found her on the ground unconscious. It was about two minutes, by his estimate, before she was conscious. She says that when she woke up and looked up at her  she could no hear him talking to her. Here in the ED she has neck pain and pain in the back of her head. No other injury. She denies alcohol today and states that she does not drink.  No daily meds.  VS in the ED BP (!) 161/86   Pulse 112   Temp 99.2  F (37.3  C)   Resp 18   SpO2 100%   Exam shows no obvious injury to the scalp. She has midline posterior neck pain and tenderness and we put her in a collar shortly after arrival.  Labs show CBC with WBC 14,800, BMP with glucose 116, INR normal, ethanol zero. CT head negative, CT C spine negative. We gave her some Tylenol prior to discharge.      I have reviewed the nursing notes.    I have reviewed the findings, diagnosis, plan and need for follow up with the patient.    Final diagnoses:   Fall on same level from slipping, initial encounter   Concussion with loss of consciousness, initial encounter       7/12/2022   [facility] AND [facility], Neftali Garrett MD  07/12/22 1062     Griseofulvin Counseling:  I discussed with the patient the risks of griseofulvin including but not limited to photosensitivity, cytopenia, liver damage, nausea/vomiting and severe allergy.  The patient understands that this medication is best absorbed when taken with a fatty meal (e.g., ice cream or french fries).

## 2023-07-20 ENCOUNTER — PATIENT OUTREACH (OUTPATIENT)
Dept: CARE COORDINATION | Facility: CLINIC | Age: 53
End: 2023-07-20
Payer: COMMERCIAL

## 2023-08-03 ENCOUNTER — PATIENT OUTREACH (OUTPATIENT)
Dept: CARE COORDINATION | Facility: CLINIC | Age: 53
End: 2023-08-03
Payer: COMMERCIAL

## 2023-09-10 ENCOUNTER — HEALTH MAINTENANCE LETTER (OUTPATIENT)
Age: 53
End: 2023-09-10

## 2023-11-17 ENCOUNTER — TRANSFERRED RECORDS (OUTPATIENT)
Dept: HEALTH INFORMATION MANAGEMENT | Facility: CLINIC | Age: 53
End: 2023-11-17
Payer: COMMERCIAL

## 2023-11-19 ENCOUNTER — HEALTH MAINTENANCE LETTER (OUTPATIENT)
Age: 53
End: 2023-11-19

## 2023-12-01 ENCOUNTER — TRANSFERRED RECORDS (OUTPATIENT)
Dept: HEALTH INFORMATION MANAGEMENT | Facility: CLINIC | Age: 53
End: 2023-12-01
Payer: COMMERCIAL

## 2024-02-23 ENCOUNTER — TRANSFERRED RECORDS (OUTPATIENT)
Dept: HEALTH INFORMATION MANAGEMENT | Facility: CLINIC | Age: 54
End: 2024-02-23
Payer: COMMERCIAL

## 2024-12-29 ENCOUNTER — HEALTH MAINTENANCE LETTER (OUTPATIENT)
Age: 54
End: 2024-12-29

## (undated) DEVICE — SOL WATER IRRIG 1000ML BOTTLE 07139-09

## (undated) RX ORDER — ONDANSETRON 2 MG/ML
INJECTION INTRAMUSCULAR; INTRAVENOUS
Status: DISPENSED
Start: 2022-11-04

## (undated) RX ORDER — FENTANYL CITRATE 50 UG/ML
INJECTION, SOLUTION INTRAMUSCULAR; INTRAVENOUS
Status: DISPENSED
Start: 2022-11-04

## (undated) RX ORDER — ACETAMINOPHEN 500 MG
TABLET ORAL
Status: DISPENSED
Start: 2022-07-12

## (undated) RX ORDER — SIMETHICONE 40MG/0.6ML
SUSPENSION, DROPS(FINAL DOSAGE FORM)(ML) ORAL
Status: DISPENSED
Start: 2022-11-04